# Patient Record
Sex: MALE | Race: WHITE | NOT HISPANIC OR LATINO | Employment: FULL TIME | URBAN - NONMETROPOLITAN AREA
[De-identification: names, ages, dates, MRNs, and addresses within clinical notes are randomized per-mention and may not be internally consistent; named-entity substitution may affect disease eponyms.]

---

## 2018-10-08 ENCOUNTER — OFFICE VISIT (OUTPATIENT)
Dept: FAMILY MEDICINE CLINIC | Facility: CLINIC | Age: 28
End: 2018-10-08

## 2018-10-08 VITALS
WEIGHT: 235 LBS | OXYGEN SATURATION: 98 % | SYSTOLIC BLOOD PRESSURE: 120 MMHG | DIASTOLIC BLOOD PRESSURE: 80 MMHG | HEART RATE: 92 BPM | HEIGHT: 68 IN | BODY MASS INDEX: 35.61 KG/M2 | TEMPERATURE: 99.1 F | RESPIRATION RATE: 16 BRPM

## 2018-10-08 DIAGNOSIS — N20.0 KIDNEY STONE: Primary | ICD-10-CM

## 2018-10-08 DIAGNOSIS — Z87.442 HISTORY OF KIDNEY STONES: ICD-10-CM

## 2018-10-08 PROCEDURE — 99214 OFFICE O/P EST MOD 30 MIN: CPT | Performed by: NURSE PRACTITIONER

## 2018-10-08 RX ORDER — HYDROCODONE BITARTRATE AND ACETAMINOPHEN 10; 325 MG/1; MG/1
1 TABLET ORAL 3 TIMES DAILY
Refills: 0 | COMMUNITY
Start: 2018-09-25 | End: 2018-12-28

## 2018-10-08 RX ORDER — TAMSULOSIN HYDROCHLORIDE 0.4 MG/1
CAPSULE ORAL
COMMUNITY
Start: 2018-07-23 | End: 2018-12-28

## 2018-10-08 RX ORDER — PHENAZOPYRIDINE HYDROCHLORIDE 200 MG/1
200 TABLET, FILM COATED ORAL 3 TIMES DAILY PRN
Qty: 30 TABLET | Refills: 0 | Status: CANCELLED | OUTPATIENT
Start: 2018-10-08

## 2018-10-08 RX ORDER — TRAMADOL HYDROCHLORIDE 50 MG/1
50 TABLET ORAL EVERY 6 HOURS PRN
Qty: 12 TABLET | Refills: 0 | Status: SHIPPED | OUTPATIENT
Start: 2018-10-08 | End: 2018-12-28

## 2018-10-08 NOTE — PROGRESS NOTES
Subjective   Joseph Jensen is a 28 y.o. male who presents to the office for [facility] follow-up.    History of Present Illness     Reviewed ER visit for [facility] on 9/25/18 for right sided pain.  Patient complaining of right lower quadrant abdominal pain, onset 1 hour ago.  History of kidney stones and having blood in the urine.  Crampy abdominal pain scale of 4 out of 10.  CBC showed white blood count count within normal limits.  The monocytes slightly elevated.  UA showed 4+ blood, 1+ protein, trace leukocytes, 50+ red blood cells, trace bacteria.  CMP showed glucose 122, , total protein 8.3.  Patient given Toradol 30 mg shot and normal saline that while in the ER.  Prescribed Flomax daily 0.4 mg and Lortab 10, 3 times a day, 90 count.  Diagnosed with primary impression of renal stone provided with information about with Dr. Adamson not improving.  Patient also had CT scan of abdomen and pelvis without contrast that showed a 4 mm calcification, large in right UP junction which is a new finding since previous exam.  The previously noted left ureteral vesicle junction calculus is not longer present in that site and there is no evidence of obstructive your opacity on either side.    Patient today states that he is still having pain and has not passed a kidney stone is straining his urine, has decreased urine output and feels the constant urge to urinate, some blood in urine, but not as bad as it was at ER visit.  States the pain is now in the right lower abdomen/pelvic region radiating into the right groin area, sharp and achy and persistent.  Patient is on his last Lortab dose has been cutting them in half.  Patient states history of kidney stones in 2014 and saw urologist Fly Trujillo through [place], would like to go back to [place] see Dr. Rodrigues.    The following portions of the patient's history were reviewed and updated as appropriate: allergies, current medications, past family history, past medical history,  "past social history, past surgical history and problem list.    OLIVE reviewed today.  10//18, it is negative, probably doesn't have time to upload refilled Lortab prescription from [facility].    Review of Systems   Constitutional: Positive for activity change. Negative for appetite change, chills, diaphoresis, fatigue, fever and unexpected weight change.   Respiratory: Negative.    Cardiovascular: Negative.    Gastrointestinal: Negative.    Genitourinary: Positive for decreased urine volume, difficulty urinating, dysuria, frequency, hematuria and urgency. Negative for discharge, enuresis, flank pain, genital sores, penile pain, penile swelling, scrotal swelling and testicular pain.        Right groin pain     Musculoskeletal: Negative for back pain.   Neurological: Negative.        Past Medical History:   Diagnosis Date   • Kidney stones        History reviewed. No pertinent family history.       Objective   /80   Pulse 92   Temp 99.1 °F (37.3 °C) (Temporal Artery )   Resp 16   Ht 172.7 cm (68\")   Wt 107 kg (235 lb)   SpO2 98%   BMI 35.73 kg/m²   Physical Exam   Constitutional: He is oriented to person, place, and time. He appears well-developed and well-nourished. He is cooperative. No distress.   Cardiovascular: Normal rate, regular rhythm, normal heart sounds and intact distal pulses.  Exam reveals no gallop and no friction rub.    No murmur heard.  Pulmonary/Chest: Effort normal and breath sounds normal. No respiratory distress. He has no wheezes. He has no rales.   Abdominal: Soft. Normal appearance and bowel sounds are normal. He exhibits no shifting dullness, no distension, no pulsatile liver, no fluid wave, no abdominal bruit, no ascites, no pulsatile midline mass and no mass. There is no hepatosplenomegaly. There is no tenderness. There is no rigidity, no rebound, no guarding and no CVA tenderness. No hernia.   Genitourinary:         Neurological: He is alert and oriented to person, place, and " time. He is not disoriented. Coordination and gait normal.   Skin: Skin is warm and dry.   Psychiatric: He has a normal mood and affect. His speech is normal and behavior is normal. He is not actively hallucinating. He is attentive.   Nursing note and vitals reviewed.       PHQ-2/PHQ-9 Depression Screening 10/8/2018   Little interest or pleasure in doing things 0   Feeling down, depressed, or hopeless 0   Total Score 0         Assessment/Plan   Joseph was seen today for follow-up.    Diagnoses and all orders for this visit:    Kidney stone  -     traMADol (ULTRAM) 50 MG tablet; Take 1 tablet by mouth Every 6 (Six) Hours As Needed for Moderate Pain  or Severe Pain .  -     Ambulatory Referral to Urology    History of kidney stones  -     Ambulatory Referral to Urology    Other orders  -     Cancel: phenazopyridine (PYRIDIUM) 200 MG tablet; Take 1 tablet by mouth 3 (Three) Times a Day As Needed for bladder spasms.           Patient here for [facility] follow-up for kidney stones.  Reviewed ER visit and summarized above.  Patient still has kidney stone that he has not passed yet 4 mm in size, patient almost out of Lortabs on his last pill today.  Educated patient that I cannot prescribe this, but will prescribe short-term dose of tramadol 4 times a day when necessary ×3 days to bridge Until he can see urologist.  OLIVE reviewed and negative for any recent narcotic prescription besides Lortabs recently prescribed [facility].  Short-term dose so no urine drug screen or contract needed today.  Patient educated about side effects of taking controlled substance as well as the drug itself tramadol.  Will get urgent follow-up with urologist, Dr. Rodrigues.  Educated patient to continue Flomax daily, follow-up with ER ASAP if unable to urinate, blood increases, or pain increases.  Patient states understanding.    Patient educated to follow-up sooner than next scheduled appointment if condition(s) worse or do not improve. Patient states  understanding and is in agreeance with plan of care. An After Visit Summary was printed and given to the patient.      AYAAN Bolton        This document has been electronically signed by AYAAN Bolton on October 8, 2018 1:31 PM      EMR/Transcription Dragon Disclaimer:  Some of this note may be an electronic dragon transcription/translation of spoken language to printed text. The electronic translation of spoken language may permit erroneous, or at times, nonsensical words or phrases to be inadvertently transcribed. Although I have reviewed the note for such errors, some may still exist.

## 2019-08-09 ENCOUNTER — OFFICE VISIT (OUTPATIENT)
Dept: FAMILY MEDICINE CLINIC | Facility: CLINIC | Age: 29
End: 2019-08-09

## 2019-08-09 VITALS
DIASTOLIC BLOOD PRESSURE: 80 MMHG | HEART RATE: 80 BPM | BODY MASS INDEX: 34.56 KG/M2 | SYSTOLIC BLOOD PRESSURE: 140 MMHG | HEIGHT: 68 IN | OXYGEN SATURATION: 99 % | RESPIRATION RATE: 16 BRPM | TEMPERATURE: 99 F | WEIGHT: 228 LBS

## 2019-08-09 DIAGNOSIS — Z72.0 NICOTINE VAPOR PRODUCT USER: ICD-10-CM

## 2019-08-09 DIAGNOSIS — G47.00 INSOMNIA, UNSPECIFIED TYPE: ICD-10-CM

## 2019-08-09 DIAGNOSIS — F41.9 ANXIETY: Primary | ICD-10-CM

## 2019-08-09 DIAGNOSIS — F41.0 PANIC ATTACK: ICD-10-CM

## 2019-08-09 DIAGNOSIS — Z87.891 FORMER SMOKER: ICD-10-CM

## 2019-08-09 PROCEDURE — 99214 OFFICE O/P EST MOD 30 MIN: CPT | Performed by: NURSE PRACTITIONER

## 2019-08-09 RX ORDER — BUPROPION HYDROCHLORIDE 150 MG/1
150 TABLET ORAL DAILY
Qty: 30 TABLET | Refills: 5 | Status: SHIPPED | OUTPATIENT
Start: 2019-08-09 | End: 2019-08-16 | Stop reason: SDUPTHER

## 2019-08-09 RX ORDER — BUSPIRONE HYDROCHLORIDE 5 MG/1
5 TABLET ORAL 3 TIMES DAILY PRN
Qty: 90 TABLET | Refills: 0 | Status: SHIPPED | OUTPATIENT
Start: 2019-08-09 | End: 2019-08-16 | Stop reason: SDUPTHER

## 2019-08-09 RX ORDER — TRAZODONE HYDROCHLORIDE 50 MG/1
50 TABLET ORAL NIGHTLY
Qty: 30 TABLET | Refills: 0 | Status: SHIPPED | OUTPATIENT
Start: 2019-08-09 | End: 2019-08-16

## 2019-08-13 ENCOUNTER — OFFICE VISIT (OUTPATIENT)
Dept: FAMILY MEDICINE CLINIC | Facility: CLINIC | Age: 29
End: 2019-08-13

## 2019-08-13 VITALS
DIASTOLIC BLOOD PRESSURE: 80 MMHG | RESPIRATION RATE: 16 BRPM | OXYGEN SATURATION: 99 % | HEART RATE: 87 BPM | HEIGHT: 68 IN | WEIGHT: 229 LBS | SYSTOLIC BLOOD PRESSURE: 124 MMHG | TEMPERATURE: 98 F | BODY MASS INDEX: 34.71 KG/M2

## 2019-08-13 DIAGNOSIS — F41.0 PANIC ATTACK: ICD-10-CM

## 2019-08-13 DIAGNOSIS — F41.9 ANXIETY: Primary | ICD-10-CM

## 2019-08-13 DIAGNOSIS — G47.00 INSOMNIA, UNSPECIFIED TYPE: ICD-10-CM

## 2019-08-13 PROCEDURE — 99214 OFFICE O/P EST MOD 30 MIN: CPT | Performed by: NURSE PRACTITIONER

## 2019-08-13 RX ORDER — QUETIAPINE FUMARATE 50 MG/1
50 TABLET, FILM COATED ORAL NIGHTLY
Qty: 30 TABLET | Refills: 0 | Status: SHIPPED | OUTPATIENT
Start: 2019-08-13 | End: 2019-08-16 | Stop reason: SDUPTHER

## 2019-08-16 ENCOUNTER — TELEPHONE (OUTPATIENT)
Dept: FAMILY MEDICINE CLINIC | Facility: CLINIC | Age: 29
End: 2019-08-16

## 2019-08-16 DIAGNOSIS — F41.0 PANIC ATTACK: ICD-10-CM

## 2019-08-16 DIAGNOSIS — F41.9 ANXIETY: ICD-10-CM

## 2019-08-16 RX ORDER — BUPROPION HYDROCHLORIDE 300 MG/1
300 TABLET ORAL DAILY
Qty: 30 TABLET | Refills: 0 | Status: SHIPPED | OUTPATIENT
Start: 2019-08-16 | End: 2019-09-10 | Stop reason: SDUPTHER

## 2019-08-16 RX ORDER — QUETIAPINE FUMARATE 50 MG/1
50 TABLET, FILM COATED ORAL NIGHTLY
Qty: 30 TABLET | Refills: 0 | Status: SHIPPED | OUTPATIENT
Start: 2019-08-16 | End: 2019-09-10 | Stop reason: SDUPTHER

## 2019-08-16 RX ORDER — BUSPIRONE HYDROCHLORIDE 5 MG/1
5 TABLET ORAL 3 TIMES DAILY PRN
Qty: 90 TABLET | Refills: 0 | Status: SHIPPED | OUTPATIENT
Start: 2019-08-16 | End: 2019-09-10 | Stop reason: SDUPTHER

## 2019-08-16 NOTE — TELEPHONE ENCOUNTER
He says he is doing great on one Seroquel nightly and 1 wellbutrin bid. He said on the Buspar he has only been taking 1 daily because the Wellbutrin has been working well. He is okay with increasing the dosage of Wellbutrin to the 300mg XL daily. He would at least like you to give him extra Buspar incase the once daily of Wellbutrin doesn't hold him over through out the day.

## 2019-08-28 PROBLEM — G47.00 INSOMNIA: Status: ACTIVE | Noted: 2019-08-28

## 2019-08-28 PROBLEM — Z72.0 NICOTINE VAPOR PRODUCT USER: Status: ACTIVE | Noted: 2019-08-28

## 2019-08-28 PROBLEM — F41.9 ANXIETY: Status: ACTIVE | Noted: 2019-08-28

## 2019-08-28 PROBLEM — F41.0 PANIC ATTACK: Status: ACTIVE | Noted: 2019-08-28

## 2019-08-28 PROBLEM — Z87.891 FORMER SMOKER: Status: ACTIVE | Noted: 2019-08-28

## 2019-08-28 NOTE — PROGRESS NOTES
Subjective   Joseph Jensen is a 29 y.o. male who presents to the office to establish care.    History of Present Illness     Anxiety/panic attacks/insomnia--chronic but worsening.  -Patient states he has a little bit a history of anxiety and depression, in 2016 his grandma passed away, he [facility] in [place] and was on some type of medication but does not remember the name of it but states that it did work for a while.  Anxiety and depression resolved for quite some time.  -8/9/2019: Patient states severe anxiety that started about 2 weeks ago, states unsure of anything that could have brought this on.  But he is having anxiety about everything and panic attacks as well.  Patient states he is never of lumbar himself before and he has flying from here to [place] next Sunday 8/18 and is having a lot of anxiety over it however the anxiety started before this.  Patient states that he is barely sleeping having trouble falling asleep and not staying asleep.  States that he is getting sick to his stomach and having headaches states it is progressively getting worse.  States that he is having panic attacks 3-4 times a day they can last a few seconds to 10 seconds but he gets sick to his stomach his respirations increased he has palpitations and he shakes.  -Plan of care: We will start patient on Wellbutrin 150 mg daily and BuSpar 5 mg 3 times daily as needed and trazodone 50 mg at bedtime, patient will follow-up with me in 1 week as this seems an acute onset none concerned about him, patient educated to call sooner if he has any problems.    Former smoker, current vapor- patient states that he smoked from 2013 until March 2019 about 2 packs/week that he stopped and switched to vaping in March 2019.  Does not want to stop vaping at this time.    Follow-up visit in.  Genetic testing at follow-up visit    Past medical history:  Kidney stones  2015- lingual nerve entrapment per patient    Surgical history:  Kidney stone  "removal 2014  wisdom teeth removal    Family history: Grandparents-diabetes, arthritis    The following portions of the patient's history were reviewed and updated as appropriate: allergies, current medications, past family history, past medical history, past social history, past surgical history and problem list.    Olive reviewed on 8/9/2019: Patient has 2 short-term prescriptions of tramadol, prescribed by myself 50 mg, 12 count on 10/8/2018 and by [facility] urologist on 10/11/2018, 50 mg, 30 count.  Short-term dose by Dr. Guillen of Lortab 10, 9 count on 9/5/2018.  Otherwise negative OLIVE.    Review of Systems   Constitutional: Negative for activity change, appetite change, chills, diaphoresis, fatigue, fever and unexpected weight change.   HENT: Negative for congestion, ear discharge, ear pain, hearing loss, postnasal drip, rhinorrhea, sinus pressure, sinus pain, sneezing, sore throat, tinnitus and trouble swallowing.    Eyes: Negative.    Respiratory: Negative for cough, chest tightness, shortness of breath and wheezing.    Cardiovascular: Negative for chest pain, palpitations and leg swelling.   Gastrointestinal: Negative for abdominal distention, abdominal pain, blood in stool, constipation, diarrhea, nausea and vomiting.   Genitourinary: Negative for decreased urine volume, discharge, dysuria, flank pain, frequency, hematuria, penile pain, penile swelling, scrotal swelling, testicular pain and urgency.   Neurological: Negative for dizziness, tremors, seizures, syncope, weakness, light-headedness, numbness and headaches.   Hematological: Does not bruise/bleed easily.   Psychiatric/Behavioral: Positive for dysphoric mood and sleep disturbance. Negative for agitation, behavioral problems, decreased concentration, self-injury and suicidal ideas. The patient is nervous/anxious.          Objective   /80   Pulse 80   Temp 99 °F (37.2 °C) (Oral)   Resp 16   Ht 172.7 cm (68\")   Wt 103 kg (228 lb)   " SpO2 99%   BMI 34.67 kg/m²   Physical Exam   Constitutional: He is oriented to person, place, and time. He appears well-developed and well-nourished. He is cooperative. No distress.   Cardiovascular: Normal rate, regular rhythm, normal heart sounds and intact distal pulses. Exam reveals no gallop and no friction rub.   No murmur heard.  Pulmonary/Chest: Effort normal and breath sounds normal. No respiratory distress. He has no wheezes. He has no rales.   Abdominal: Soft. Normal appearance and bowel sounds are normal. He exhibits no shifting dullness, no distension, no pulsatile liver, no fluid wave, no abdominal bruit, no ascites, no pulsatile midline mass and no mass. There is no hepatosplenomegaly. There is no tenderness. There is no rigidity, no rebound, no guarding and no CVA tenderness. No hernia.   Neurological: He is alert and oriented to person, place, and time. He is not disoriented. Coordination and gait normal.   Skin: Skin is warm and dry.   Psychiatric: His speech is normal and behavior is normal. Thought content normal. His mood appears anxious. His affect is not angry, not blunt, not labile and not inappropriate. He is not actively hallucinating. Cognition and memory are normal. He does not exhibit a depressed mood.   Patient is dressed appropriately for weather and situation, makes eye contact, and engages in conversation.  He is attentive.   Nursing note and vitals reviewed.       PHQ-2/PHQ-9 Depression Screening 10/8/2018   Little interest or pleasure in doing things 0   Feeling down, depressed, or hopeless 0   Total Score 0         Assessment/Plan   Joseph was seen today for establish care.    Diagnoses and all orders for this visit:    Anxiety  -     Discontinue: buPROPion XL (WELLBUTRIN XL) 150 MG 24 hr tablet; Take 1 tablet by mouth Daily.  -     Discontinue: busPIRone (BUSPAR) 5 MG tablet; Take 1 tablet by mouth 3 (Three) Times a Day As Needed (anxiety).    Panic attack  -     Discontinue:  buPROPion XL (WELLBUTRIN XL) 150 MG 24 hr tablet; Take 1 tablet by mouth Daily.  -     Discontinue: busPIRone (BUSPAR) 5 MG tablet; Take 1 tablet by mouth 3 (Three) Times a Day As Needed (anxiety).    Insomnia, unspecified type  -     Discontinue: traZODone (DESYREL) 50 MG tablet; Take 1 tablet by mouth Every Night.    Former smoker    Nicotine vapor product user           Anxiety/panic attacks/insomnia--chronic but worsening.  -Plan of care: We will start patient on Wellbutrin 150 mg daily and BuSpar 5 mg 3 times daily as needed and trazodone 50 mg at bedtime, patient will follow-up with me in 1 week as this seems an acute onset none concerned about him, patient educated to call sooner if he has any problems.    Former smoker, current vapor- patient states that he smoked from 2013 until March 2019 about 2 packs/week that he stopped and switched to vaping in March 2019.  Does not want to stop vaping at this time.  -Plan of care: Continue to monitor    Follow-up visit in.  Genetic testing at follow-up visit    Patient educated to follow-up sooner than next scheduled appointment if condition(s) worse or do not improve. Patient states understanding and is in agreeance with plan of care. An After Visit Summary was printed and given to the patient.      AYAAN Bolton        This document has been electronically signed by AYAAN Bolton on August 28, 2019 10:51 AM      EMR/Transcription Dragon Disclaimer:  Some of this note may be an electronic dragon transcription/translation of spoken language to printed text. The electronic translation of spoken language may permit erroneous, or at times, nonsensical words or phrases to be inadvertently transcribed. Although I have reviewed the note for such errors, some may still exist.

## 2019-09-02 NOTE — PROGRESS NOTES
Subjective   Joseph Jensen is a 29 y.o. male who presents to the office for follow-up on anxiety.  History of Present Illness     Reminder: Do GENETIC testing at next visit.    Anxiety/panic attacks/insomnia--chronic, improving somewhat but not controlled with on Wellbutrin 150 mg daily, BuSpar 5 mg 3 times daily as needed, trazodone 100 mg at bedtime.  Insomnia not improving at all.  -Patient states he has a little bit a history of anxiety and depression, in 2016 his grandma passed away, he saw [facility] in [place] and was on some type of medication but does not remember the name of it but states that it did work for a while.  Anxiety and depression resolved for quite some time.  -8/9/2019: Patient states severe anxiety that started about 2 weeks ago, states unsure of anything that could have brought this on.  But he is having anxiety about everything and panic attacks as well.  Patient states he is never of lumbar himself before and he has flying from here to [place] next Sunday 8/18 and is having a lot of anxiety over it however the anxiety started before this.  Patient states that he is barely sleeping having trouble falling asleep and not staying asleep.  States that he is getting sick to his stomach and having headaches states it is progressively getting worse.  States that he is having panic attacks 3-4 times a day they can last a few seconds to 10 seconds but he gets sick to his stomach his respirations increased he has palpitations and he shakes.  Plan of care was to try Wellbutrin BuSpar and trazodone.  -8/13/2019: Patient states that his panic attacks have decreased it takes about 30 minutes for the medications to work at 3 PM is wearing off and he is feeling anxious, have not tried a BuSpar at this time.  States he is having no appetite but still not sleeping if he does sleep its from about 11 PM to 4 AM.  Has tried trazodone at 100 mg.  -Plan of care: Titrate up and try trazodone at 200 mg at  bedtime, if this does not work try Seroquel at 50 mg and go up to 100 mg.  Continue BuSpar 3 times daily.  Consider doubling Wellbutrin.  Try one medication change at a time.  Call me on Friday and let me know how you are doing and I will refill times a month and then follow-up with you.    Past medical history:  Former smoker, current vapor- patient states that he smoked from 2013 until March 2019 about 2 packs/week that he stopped and switched to vaping in March 2019.  Does not want to stop vaping at this time.  Kidney stones  2015- lingual nerve entrapment per patient    Surgical history:  Kidney stone removal 2014  wisdom teeth removal    Family history: Grandparents-diabetes, arthritis    The following portions of the patient's history were reviewed and updated as appropriate: allergies, current medications, past family history, past medical history, past social history, past surgical history and problem list.    Olive reviewed on 8/9/2019: Patient has 2 short-term prescriptions of tramadol, prescribed by myself 50 mg, 12 count on 10/8/2018 and by [facility] urologist on 10/11/2018, 50 mg, 30 count.  Short-term dose by Dr. Guillen of Lortab 10, 9 count on 9/5/2018.  Otherwise negative OLIVE.    Review of Systems   Constitutional: Negative for activity change, appetite change, chills, diaphoresis, fatigue, fever and unexpected weight change.   HENT: Negative for congestion, ear discharge, ear pain, hearing loss, postnasal drip, rhinorrhea, sinus pressure, sinus pain, sneezing, sore throat, tinnitus and trouble swallowing.    Eyes: Negative.    Respiratory: Negative for cough, chest tightness, shortness of breath and wheezing.    Cardiovascular: Negative for chest pain, palpitations and leg swelling.   Gastrointestinal: Negative for abdominal distention, abdominal pain, blood in stool, constipation, diarrhea, nausea and vomiting.   Genitourinary: Negative for decreased urine volume, discharge, dysuria, flank  "pain, frequency, hematuria, penile pain, penile swelling, scrotal swelling, testicular pain and urgency.   Neurological: Negative for dizziness, tremors, seizures, syncope, weakness, light-headedness, numbness and headaches.   Hematological: Does not bruise/bleed easily.   Psychiatric/Behavioral: Positive for dysphoric mood and sleep disturbance. Negative for agitation, behavioral problems, decreased concentration, self-injury and suicidal ideas. The patient is nervous/anxious.          Objective   /80   Pulse 87   Temp 98 °F (36.7 °C) (Oral)   Resp 16   Ht 172.7 cm (68\")   Wt 104 kg (229 lb)   SpO2 99%   BMI 34.82 kg/m²   Physical Exam   Constitutional: He is oriented to person, place, and time. He appears well-developed and well-nourished. He is cooperative. No distress.   Cardiovascular: Normal rate, regular rhythm, normal heart sounds and intact distal pulses. Exam reveals no gallop and no friction rub.   No murmur heard.  Pulmonary/Chest: Effort normal and breath sounds normal. No respiratory distress. He has no wheezes. He has no rales.   Abdominal: Soft. Normal appearance and bowel sounds are normal. He exhibits no shifting dullness, no distension, no pulsatile liver, no fluid wave, no abdominal bruit, no ascites, no pulsatile midline mass and no mass. There is no hepatosplenomegaly. There is no tenderness. There is no rigidity, no rebound, no guarding and no CVA tenderness. No hernia.   Neurological: He is alert and oriented to person, place, and time. He is not disoriented. Coordination and gait normal.   Skin: Skin is warm and dry.   Psychiatric: His speech is normal and behavior is normal. Thought content normal. His mood appears anxious. His affect is not angry, not blunt, not labile and not inappropriate. He is not actively hallucinating. Cognition and memory are normal. He does not exhibit a depressed mood.   Patient is dressed appropriately for weather and situation, makes eye contact, " and engages in conversation.  He is attentive.   Nursing note and vitals reviewed.       PHQ-2/PHQ-9 Depression Screening 10/8/2018   Little interest or pleasure in doing things 0   Feeling down, depressed, or hopeless 0   Total Score 0         Assessment/Plan   Joseph was seen today for follow-up.    Diagnoses and all orders for this visit:    Anxiety    Panic attack    Insomnia, unspecified type    Other orders  -     Discontinue: QUEtiapine (SEROquel) 50 MG tablet; Take 1 tablet by mouth Every Night.           Anxiety/panic attacks/insomnia--chronic, improving somewhat but not controlled with on Wellbutrin 150 mg daily, BuSpar 5 mg 3 times daily as needed, trazodone 100 mg at bedtime.  Insomnia not improving at all.  -Patient states he has a little bit a history of anxiety and depression, in 2016 his grandma passed away, he [facility] in [place] and was on some type of medication but does not remember the name of it but states that it did work for a while.  Anxiety and depression resolved for quite some time.  -Plan of care: Titrate up and try trazodone at 200 mg at bedtime, if this does not work try Seroquel at 50 mg and go up to 100 mg.  Continue BuSpar 3 times daily.  Consider doubling Wellbutrin.  Try one medication change at a time.  Call me on Friday and let me know how you are doing and I will refill times a month and then follow-up with you.    Follow-up visit in 1 month.  Genetic testing at follow-up visit    Patient educated to follow-up sooner than next scheduled appointment if condition(s) worse or do not improve. Patient states understanding and is in agreeance with plan of care. An After Visit Summary was printed and given to the patient.      AYAAN Bolton        This document has been electronically signed by AYAAN Bolton on September 2, 2019 4:13 PM      EMR/Transcription Dragon Disclaimer:  Some of this note may be an electronic dragon transcription/translation of spoken  language to printed text. The electronic translation of spoken language may permit erroneous, or at times, nonsensical words or phrases to be inadvertently transcribed. Although I have reviewed the note for such errors, some may still exist.

## 2019-09-10 ENCOUNTER — OFFICE VISIT (OUTPATIENT)
Dept: FAMILY MEDICINE CLINIC | Facility: CLINIC | Age: 29
End: 2019-09-10

## 2019-09-10 VITALS
DIASTOLIC BLOOD PRESSURE: 90 MMHG | BODY MASS INDEX: 35.31 KG/M2 | HEIGHT: 68 IN | RESPIRATION RATE: 16 BRPM | OXYGEN SATURATION: 99 % | HEART RATE: 99 BPM | TEMPERATURE: 97.8 F | WEIGHT: 233 LBS | SYSTOLIC BLOOD PRESSURE: 130 MMHG

## 2019-09-10 DIAGNOSIS — F41.0 PANIC ATTACK: ICD-10-CM

## 2019-09-10 DIAGNOSIS — F41.9 ANXIETY: ICD-10-CM

## 2019-09-10 DIAGNOSIS — G47.00 INSOMNIA, UNSPECIFIED TYPE: Primary | ICD-10-CM

## 2019-09-10 PROCEDURE — 99213 OFFICE O/P EST LOW 20 MIN: CPT | Performed by: NURSE PRACTITIONER

## 2019-09-10 RX ORDER — BUPROPION HYDROCHLORIDE 300 MG/1
300 TABLET ORAL DAILY
Qty: 30 TABLET | Refills: 5 | Status: SHIPPED | OUTPATIENT
Start: 2019-09-10 | End: 2020-01-17 | Stop reason: SDUPTHER

## 2019-09-10 RX ORDER — BUSPIRONE HYDROCHLORIDE 5 MG/1
5 TABLET ORAL 3 TIMES DAILY PRN
Qty: 90 TABLET | Refills: 5 | Status: SHIPPED | OUTPATIENT
Start: 2019-09-10 | End: 2020-01-17 | Stop reason: SDUPTHER

## 2019-09-10 RX ORDER — QUETIAPINE FUMARATE 50 MG/1
50 TABLET, FILM COATED ORAL NIGHTLY
Qty: 30 TABLET | Refills: 5 | Status: SHIPPED | OUTPATIENT
Start: 2019-09-10 | End: 2020-01-17 | Stop reason: SDUPTHER

## 2020-01-17 ENCOUNTER — OFFICE VISIT (OUTPATIENT)
Dept: FAMILY MEDICINE CLINIC | Facility: CLINIC | Age: 30
End: 2020-01-17

## 2020-01-17 ENCOUNTER — LAB (OUTPATIENT)
Dept: LAB | Facility: OTHER | Age: 30
End: 2020-01-17

## 2020-01-17 VITALS
DIASTOLIC BLOOD PRESSURE: 80 MMHG | WEIGHT: 235 LBS | OXYGEN SATURATION: 99 % | HEART RATE: 69 BPM | RESPIRATION RATE: 16 BRPM | HEIGHT: 68 IN | SYSTOLIC BLOOD PRESSURE: 122 MMHG | BODY MASS INDEX: 35.61 KG/M2 | TEMPERATURE: 98.1 F

## 2020-01-17 DIAGNOSIS — M62.838 MUSCLE SPASM: ICD-10-CM

## 2020-01-17 DIAGNOSIS — Z83.79 FAMILY HISTORY OF LIVER DISEASE: ICD-10-CM

## 2020-01-17 DIAGNOSIS — F41.9 ANXIETY: ICD-10-CM

## 2020-01-17 DIAGNOSIS — N20.0 KIDNEY STONES: ICD-10-CM

## 2020-01-17 DIAGNOSIS — R10.84 GENERALIZED ABDOMINAL PAIN: ICD-10-CM

## 2020-01-17 DIAGNOSIS — R11.2 NAUSEA AND VOMITING, INTRACTABILITY OF VOMITING NOT SPECIFIED, UNSPECIFIED VOMITING TYPE: ICD-10-CM

## 2020-01-17 DIAGNOSIS — G47.00 INSOMNIA, UNSPECIFIED TYPE: ICD-10-CM

## 2020-01-17 DIAGNOSIS — G56.01 RIGHT CARPAL TUNNEL SYNDROME: Primary | ICD-10-CM

## 2020-01-17 DIAGNOSIS — F41.0 PANIC ATTACK: ICD-10-CM

## 2020-01-17 LAB
ALBUMIN SERPL-MCNC: 4.9 G/DL (ref 3.5–5)
ALBUMIN/GLOB SERPL: 1.3 G/DL (ref 1.1–1.8)
ALP SERPL-CCNC: 64 U/L (ref 38–126)
ALT SERPL W P-5'-P-CCNC: 96 U/L
ANION GAP SERPL CALCULATED.3IONS-SCNC: 10 MMOL/L (ref 5–15)
AST SERPL-CCNC: 44 U/L (ref 17–59)
BILIRUB SERPL-MCNC: 0.5 MG/DL (ref 0.2–1.3)
BUN BLD-MCNC: 13 MG/DL (ref 7–23)
BUN/CREAT SERPL: 12.9 (ref 7–25)
CALCIUM SPEC-SCNC: 10.1 MG/DL (ref 8.4–10.2)
CHLORIDE SERPL-SCNC: 102 MMOL/L (ref 101–112)
CO2 SERPL-SCNC: 30 MMOL/L (ref 22–30)
CREAT BLD-MCNC: 1.01 MG/DL (ref 0.7–1.3)
GFR SERPL CREATININE-BSD FRML MDRD: 88 ML/MIN/1.73 (ref 77–179)
GLOBULIN UR ELPH-MCNC: 3.8 GM/DL (ref 2.3–3.5)
GLUCOSE BLD-MCNC: 97 MG/DL (ref 70–99)
MAGNESIUM SERPL-MCNC: 2.1 MG/DL (ref 1.6–2.3)
POTASSIUM BLD-SCNC: 4 MMOL/L (ref 3.4–5)
PROT SERPL-MCNC: 8.7 G/DL (ref 6.3–8.6)
SODIUM BLD-SCNC: 142 MMOL/L (ref 137–145)

## 2020-01-17 PROCEDURE — 99214 OFFICE O/P EST MOD 30 MIN: CPT | Performed by: NURSE PRACTITIONER

## 2020-01-17 PROCEDURE — 80053 COMPREHEN METABOLIC PANEL: CPT | Performed by: NURSE PRACTITIONER

## 2020-01-17 PROCEDURE — 83735 ASSAY OF MAGNESIUM: CPT | Performed by: NURSE PRACTITIONER

## 2020-01-17 RX ORDER — PHENAZOPYRIDINE HYDROCHLORIDE 100 MG/1
100 TABLET, FILM COATED ORAL 3 TIMES DAILY PRN
Qty: 30 TABLET | Refills: 0 | Status: SHIPPED | OUTPATIENT
Start: 2020-01-17 | End: 2020-05-15

## 2020-01-17 RX ORDER — BUSPIRONE HYDROCHLORIDE 5 MG/1
5 TABLET ORAL 3 TIMES DAILY PRN
Qty: 90 TABLET | Refills: 5 | Status: SHIPPED | OUTPATIENT
Start: 2020-01-17 | End: 2020-05-15 | Stop reason: SDUPTHER

## 2020-01-17 RX ORDER — QUETIAPINE FUMARATE 50 MG/1
50 TABLET, FILM COATED ORAL NIGHTLY
Qty: 30 TABLET | Refills: 5 | Status: SHIPPED | OUTPATIENT
Start: 2020-01-17 | End: 2020-05-15 | Stop reason: SDUPTHER

## 2020-01-17 RX ORDER — BUPROPION HYDROCHLORIDE 300 MG/1
300 TABLET ORAL DAILY
Qty: 30 TABLET | Refills: 5 | Status: SHIPPED | OUTPATIENT
Start: 2020-01-17 | End: 2020-05-15 | Stop reason: SDUPTHER

## 2020-02-18 DIAGNOSIS — K76.0 FATTY LIVER: ICD-10-CM

## 2020-02-18 DIAGNOSIS — R74.8 ELEVATED LIVER ENZYMES: ICD-10-CM

## 2020-02-18 DIAGNOSIS — Z83.79 FAMILY HISTORY OF LIVER DISEASE: Primary | ICD-10-CM

## 2020-02-19 PROBLEM — Z83.79 FAMILY HISTORY OF CIRRHOSIS OF LIVER: Status: ACTIVE | Noted: 2020-02-19

## 2020-02-19 PROBLEM — R74.01 ELEVATED ALT MEASUREMENT: Status: ACTIVE | Noted: 2020-02-19

## 2020-02-19 PROBLEM — K76.0 FATTY LIVER: Status: ACTIVE | Noted: 2020-02-19

## 2020-02-19 PROBLEM — Z87.442 HISTORY OF KIDNEY STONES: Status: ACTIVE | Noted: 2020-02-19

## 2020-02-20 ENCOUNTER — OFFICE VISIT (OUTPATIENT)
Dept: FAMILY MEDICINE CLINIC | Facility: CLINIC | Age: 30
End: 2020-02-20

## 2020-02-20 VITALS
HEIGHT: 68 IN | HEART RATE: 84 BPM | BODY MASS INDEX: 36.53 KG/M2 | SYSTOLIC BLOOD PRESSURE: 100 MMHG | TEMPERATURE: 97.2 F | RESPIRATION RATE: 16 BRPM | WEIGHT: 241 LBS | OXYGEN SATURATION: 99 % | DIASTOLIC BLOOD PRESSURE: 80 MMHG

## 2020-02-20 DIAGNOSIS — Z23 NEED FOR IMMUNIZATION AGAINST TYPHOID: ICD-10-CM

## 2020-02-20 DIAGNOSIS — F41.9 ANXIETY: ICD-10-CM

## 2020-02-20 DIAGNOSIS — Z71.84 TRAVEL ADVICE ENCOUNTER: Primary | ICD-10-CM

## 2020-02-20 DIAGNOSIS — Z87.442 HISTORY OF KIDNEY STONES: ICD-10-CM

## 2020-02-20 DIAGNOSIS — K76.0 FATTY LIVER: ICD-10-CM

## 2020-02-20 DIAGNOSIS — F41.0 PANIC ATTACK: ICD-10-CM

## 2020-02-20 DIAGNOSIS — Z83.79 FAMILY HISTORY OF CIRRHOSIS OF LIVER: ICD-10-CM

## 2020-02-20 DIAGNOSIS — R74.01 ELEVATED ALT MEASUREMENT: ICD-10-CM

## 2020-02-20 DIAGNOSIS — G47.00 INSOMNIA, UNSPECIFIED TYPE: ICD-10-CM

## 2020-02-20 PROBLEM — B34.9 VIRAL INFECTION: Status: ACTIVE | Noted: 2020-02-20

## 2020-02-20 PROBLEM — N20.1 URETEROLITHIASIS: Status: ACTIVE | Noted: 2020-02-20

## 2020-02-20 PROBLEM — N30.90 CYSTITIS: Status: ACTIVE | Noted: 2020-02-20

## 2020-02-20 PROBLEM — R51.9 HEADACHE: Status: ACTIVE | Noted: 2020-02-20

## 2020-02-20 PROCEDURE — 90471 IMMUNIZATION ADMIN: CPT | Performed by: NURSE PRACTITIONER

## 2020-02-20 PROCEDURE — 90632 HEPA VACCINE ADULT IM: CPT | Performed by: NURSE PRACTITIONER

## 2020-02-20 PROCEDURE — 99214 OFFICE O/P EST MOD 30 MIN: CPT | Performed by: NURSE PRACTITIONER

## 2020-02-20 RX ORDER — AZITHROMYCIN 500 MG/1
500 TABLET, FILM COATED ORAL DAILY
Qty: 7 TABLET | Refills: 0 | Status: SHIPPED | OUTPATIENT
Start: 2020-02-20 | End: 2020-05-15

## 2020-05-15 ENCOUNTER — OFFICE VISIT (OUTPATIENT)
Dept: FAMILY MEDICINE CLINIC | Facility: CLINIC | Age: 30
End: 2020-05-15

## 2020-05-15 DIAGNOSIS — F41.9 ANXIETY: ICD-10-CM

## 2020-05-15 DIAGNOSIS — G44.219 EPISODIC TENSION-TYPE HEADACHE, NOT INTRACTABLE: ICD-10-CM

## 2020-05-15 DIAGNOSIS — G47.00 INSOMNIA, UNSPECIFIED TYPE: ICD-10-CM

## 2020-05-15 DIAGNOSIS — F41.0 PANIC ATTACK: ICD-10-CM

## 2020-05-15 DIAGNOSIS — G56.01 RIGHT CARPAL TUNNEL SYNDROME: ICD-10-CM

## 2020-05-15 DIAGNOSIS — M65.4 DE QUERVAIN'S TENOSYNOVITIS, RIGHT: Primary | ICD-10-CM

## 2020-05-15 PROCEDURE — 99442 PR PHYS/QHP TELEPHONE EVALUATION 11-20 MIN: CPT | Performed by: NURSE PRACTITIONER

## 2020-05-15 RX ORDER — BUSPIRONE HYDROCHLORIDE 5 MG/1
5 TABLET ORAL 3 TIMES DAILY PRN
Qty: 90 TABLET | Refills: 5 | Status: SHIPPED | OUTPATIENT
Start: 2020-05-15 | End: 2021-11-02 | Stop reason: SDUPTHER

## 2020-05-15 RX ORDER — QUETIAPINE FUMARATE 50 MG/1
50 TABLET, FILM COATED ORAL NIGHTLY
Qty: 30 TABLET | Refills: 5 | Status: SHIPPED | OUTPATIENT
Start: 2020-05-15 | End: 2021-11-02

## 2020-05-15 RX ORDER — BUPROPION HYDROCHLORIDE 300 MG/1
300 TABLET ORAL DAILY
Qty: 30 TABLET | Refills: 5 | Status: SHIPPED | OUTPATIENT
Start: 2020-05-15 | End: 2021-11-02 | Stop reason: SDUPTHER

## 2020-05-15 RX ORDER — KETOROLAC TROMETHAMINE 10 MG/1
10 TABLET, FILM COATED ORAL 2 TIMES DAILY
Qty: 10 TABLET | Refills: 0 | Status: SHIPPED | OUTPATIENT
Start: 2020-05-15 | End: 2020-05-20

## 2020-06-08 PROBLEM — G56.01 RIGHT CARPAL TUNNEL SYNDROME: Status: ACTIVE | Noted: 2020-06-08

## 2020-06-08 PROBLEM — M65.4 DE QUERVAIN'S TENOSYNOVITIS, RIGHT: Status: ACTIVE | Noted: 2020-06-08

## 2020-10-27 ENCOUNTER — OFFICE VISIT (OUTPATIENT)
Dept: FAMILY MEDICINE CLINIC | Facility: CLINIC | Age: 30
End: 2020-10-27

## 2020-10-27 VITALS
HEIGHT: 60 IN | BODY MASS INDEX: 47.36 KG/M2 | OXYGEN SATURATION: 99 % | SYSTOLIC BLOOD PRESSURE: 134 MMHG | HEART RATE: 88 BPM | DIASTOLIC BLOOD PRESSURE: 82 MMHG | RESPIRATION RATE: 16 BRPM | WEIGHT: 241.2 LBS

## 2020-10-27 DIAGNOSIS — R21 RASH: ICD-10-CM

## 2020-10-27 DIAGNOSIS — B02.9 HERPES ZOSTER WITHOUT COMPLICATION: Primary | ICD-10-CM

## 2020-10-27 DIAGNOSIS — M79.601 RIGHT ARM PAIN: ICD-10-CM

## 2020-10-27 PROCEDURE — 99214 OFFICE O/P EST MOD 30 MIN: CPT | Performed by: NURSE PRACTITIONER

## 2020-10-27 RX ORDER — GABAPENTIN 300 MG/1
300 CAPSULE ORAL 3 TIMES DAILY PRN
Qty: 30 CAPSULE | Refills: 0 | Status: SHIPPED | OUTPATIENT
Start: 2020-10-27 | End: 2021-11-02

## 2020-10-27 RX ORDER — ACYCLOVIR 400 MG/1
400 TABLET ORAL
Qty: 50 TABLET | Refills: 0 | Status: SHIPPED | OUTPATIENT
Start: 2020-10-27 | End: 2020-11-06

## 2020-10-27 RX ORDER — PREDNISONE 1 MG/1
5 TABLET ORAL DAILY
Qty: 5 TABLET | Refills: 0 | Status: SHIPPED | OUTPATIENT
Start: 2020-10-27 | End: 2021-11-02

## 2021-01-18 ENCOUNTER — OFFICE VISIT (OUTPATIENT)
Dept: FAMILY MEDICINE CLINIC | Facility: CLINIC | Age: 31
End: 2021-01-18

## 2021-01-18 DIAGNOSIS — M54.50 LUMBAR PAIN: ICD-10-CM

## 2021-01-18 DIAGNOSIS — M54.6 ACUTE BILATERAL THORACIC BACK PAIN: Primary | ICD-10-CM

## 2021-01-18 PROCEDURE — 99443 PR PHYS/QHP TELEPHONE EVALUATION 21-30 MIN: CPT | Performed by: NURSE PRACTITIONER

## 2021-01-18 RX ORDER — BACLOFEN 10 MG/1
10 TABLET ORAL 3 TIMES DAILY PRN
Qty: 90 TABLET | Refills: 0 | Status: SHIPPED | OUTPATIENT
Start: 2021-01-18

## 2021-01-18 RX ORDER — KETOROLAC TROMETHAMINE 10 MG/1
10 TABLET, FILM COATED ORAL EVERY 6 HOURS PRN
Qty: 15 TABLET | Refills: 0 | Status: SHIPPED | OUTPATIENT
Start: 2021-01-18 | End: 2021-04-27 | Stop reason: SDUPTHER

## 2021-01-18 RX ORDER — PREDNISONE 1 MG/1
TABLET ORAL
Qty: 18 TABLET | Refills: 0 | Status: SHIPPED | OUTPATIENT
Start: 2021-01-18 | End: 2021-11-02

## 2021-04-27 DIAGNOSIS — M54.6 ACUTE BILATERAL THORACIC BACK PAIN: ICD-10-CM

## 2021-04-27 DIAGNOSIS — M54.50 LUMBAR PAIN: ICD-10-CM

## 2021-04-27 RX ORDER — KETOROLAC TROMETHAMINE 10 MG/1
10 TABLET, FILM COATED ORAL EVERY 6 HOURS PRN
Qty: 15 TABLET | Refills: 0 | Status: SHIPPED | OUTPATIENT
Start: 2021-04-27 | End: 2021-11-02

## 2021-11-02 ENCOUNTER — OFFICE VISIT (OUTPATIENT)
Dept: FAMILY MEDICINE CLINIC | Facility: CLINIC | Age: 31
End: 2021-11-02

## 2021-11-02 VITALS
HEART RATE: 92 BPM | WEIGHT: 244.8 LBS | BODY MASS INDEX: 37.1 KG/M2 | DIASTOLIC BLOOD PRESSURE: 90 MMHG | HEIGHT: 68 IN | OXYGEN SATURATION: 98 % | SYSTOLIC BLOOD PRESSURE: 124 MMHG

## 2021-11-02 DIAGNOSIS — K76.0 FATTY LIVER: ICD-10-CM

## 2021-11-02 DIAGNOSIS — F32.A ANXIETY AND DEPRESSION: ICD-10-CM

## 2021-11-02 DIAGNOSIS — F41.9 ANXIETY AND DEPRESSION: ICD-10-CM

## 2021-11-02 DIAGNOSIS — Z76.89 ENCOUNTER TO ESTABLISH CARE: ICD-10-CM

## 2021-11-02 DIAGNOSIS — Z00.00 ANNUAL PHYSICAL EXAM: Primary | ICD-10-CM

## 2021-11-02 DIAGNOSIS — R53.83 FATIGUE, UNSPECIFIED TYPE: ICD-10-CM

## 2021-11-02 PROCEDURE — 99395 PREV VISIT EST AGE 18-39: CPT | Performed by: NURSE PRACTITIONER

## 2021-11-02 PROCEDURE — 2014F MENTAL STATUS ASSESS: CPT | Performed by: NURSE PRACTITIONER

## 2021-11-02 PROCEDURE — 3008F BODY MASS INDEX DOCD: CPT | Performed by: NURSE PRACTITIONER

## 2021-11-02 RX ORDER — BUPROPION HYDROCHLORIDE 300 MG/1
300 TABLET ORAL DAILY
Qty: 30 TABLET | Refills: 5 | Status: SHIPPED | OUTPATIENT
Start: 2021-11-02 | End: 2021-12-02 | Stop reason: SDUPTHER

## 2021-11-02 RX ORDER — BUSPIRONE HYDROCHLORIDE 5 MG/1
5 TABLET ORAL 3 TIMES DAILY PRN
Qty: 90 TABLET | Refills: 5 | Status: SHIPPED | OUTPATIENT
Start: 2021-11-02 | End: 2021-12-02 | Stop reason: SDUPTHER

## 2021-11-02 NOTE — PATIENT INSTRUCTIONS
Fatigue  If you have fatigue, you feel tired all the time and have a lack of energy or a lack of motivation. Fatigue may make it difficult to start or complete tasks because of exhaustion. In general, occasional or mild fatigue is often a normal response to activity or life. However, long-lasting (chronic) or extreme fatigue may be a symptom of a medical condition.  Follow these instructions at home:  General instructions  · Watch your fatigue for any changes.  · Go to bed and get up at the same time every day.  · Avoid fatigue by pacing yourself during the day and getting enough sleep at night.  · Maintain a healthy weight.  Medicines  · Take over-the-counter and prescription medicines only as told by your health care provider.  · Take a multivitamin, if told by your health care provider.   · Do not use herbal or dietary supplements unless they are approved by your health care provider.  Activity    · Exercise regularly, as told by your health care provider.  · Use or practice techniques to help you relax, such as yoga, valerio chi, meditation, or massage therapy.    Eating and drinking    · Avoid heavy meals in the evening.  · Eat a well-balanced diet, which includes lean proteins, whole grains, plenty of fruits and vegetables, and low-fat dairy products.  · Avoid consuming too much caffeine.  · Avoid the use of alcohol.  · Drink enough fluid to keep your urine pale yellow.    Lifestyle  · Change situations that cause you stress. Try to keep your work and personal schedule in balance.  · Do not use any products that contain nicotine or tobacco, such as cigarettes and e-cigarettes. If you need help quitting, ask your health care provider.  · Do not use drugs.  Contact a health care provider if:  · Your fatigue does not get better.  · You have a fever.  · You suddenly lose or gain weight.  · You have headaches.  · You have trouble falling asleep or sleeping through the night.  · You feel angry, guilty, anxious, or  sad.  · You are unable to have a bowel movement (constipation).  · Your skin is dry.  · You have swelling in your legs or another part of your body.  Get help right away if:  · You feel confused.  · Your vision is blurry.  · You feel faint or you pass out.  · You have a severe headache.  · You have severe pain in your abdomen, your back, or the area between your waist and hips (pelvis).  · You have chest pain, shortness of breath, or an irregular or fast heartbeat.  · You are unable to urinate, or you urinate less than normal.  · You have abnormal bleeding, such as bleeding from the rectum, vagina, nose, lungs, or nipples.  · You vomit blood.  · You have thoughts about hurting yourself or others.  If you ever feel like you may hurt yourself or others, or have thoughts about taking your own life, get help right away. You can go to your nearest emergency department or call:  · Your local emergency services (911 in the [place]  · A suicide crisis helpline, such as the National Suicide Prevention Lifeline at 1-353.163.2969. This is open 24 hours a day.  Summary  · If you have fatigue, you feel tired all the time and have a lack of energy or a lack of motivation.  · Fatigue may make it difficult to start or complete tasks because of exhaustion.  · Long-lasting (chronic) or extreme fatigue may be a symptom of a medical condition.  · Exercise regularly, as told by your health care provider.  · Change situations that cause you stress. Try to keep your work and personal schedule in balance.  This information is not intended to replace advice given to you by your health care provider. Make sure you discuss any questions you have with your health care provider.  Document Revised: 07/08/2020 Document Reviewed: 09/12/2018  Elsevier Patient Education © 2021 [employer].

## 2021-11-02 NOTE — PROGRESS NOTES
CC: Establish Care and Fatigue (doesnt know if its depression, ready to go to bed after getting off work, and having no energy )      Subjective:  Joseph Jensen is a 31 y.o. male who presents for     Patient presents to office to establish care today.  Formerly a patient of MARIA TERESA Roger APRN    Is in need of a check up with labs    CMH: History of kidney stones, anxiety/depression, insomnia, fatty liver, and former smoker quit 2-3 years ago. Vapes on occasion.    Pt hasn't decided if he is going to get the COVID-19 vaccine. States he had covid last month.     Pt declines update in Tdap and Flu vaccines today.     C/O fatigue ongoing for about 3-4 months. Worse after having COVID-19 last month. Wants to sleep all the time. States he goes to work and comes home goes to bed and sleeps until time for work.    PHQ-9 is 19 and SUSANNE-7 is 13 he does have anxiety and depression going on. Denies SI/HI. Has noted that he doesn't want to do things he use to do. States has only been taking the Wellbutrin on an as needed basis and can't remember when he last took this. Takes Buspar as needed. Though, admits that it has been a while since he took this also.      The following portions of the patient's history were reviewed and updated as appropriate: allergies, current medications, past family history, past medical history, past social history, past surgical history and problem list.    Past Medical History:   Diagnosis Date   • ADHD (attention deficit hyperactivity disorder)    • Anxiety    • Headache    • Kidney stones          Current Outpatient Medications:   •  baclofen (LIORESAL) 10 MG tablet, Take 1 tablet by mouth 3 (Three) Times a Day As Needed for Muscle Spasms (muscular cramps/tension)., Disp: 90 tablet, Rfl: 0  •  buPROPion XL (WELLBUTRIN XL) 300 MG 24 hr tablet, Take 1 tablet by mouth Daily., Disp: 30 tablet, Rfl: 5  •  busPIRone (BUSPAR) 5 MG tablet, Take 1 tablet by mouth 3 (Three) Times a Day As Needed (anxiety).,  "Disp: 90 tablet, Rfl: 5    Review of Systems    Review of Systems   Constitutional: Positive for fatigue.   HENT: Negative.    Eyes: Negative.    Respiratory: Negative.    Cardiovascular: Negative.    Gastrointestinal: Negative.    Endocrine: Negative.    Genitourinary: Negative.    Musculoskeletal: Negative.    Skin: Negative.    Allergic/Immunologic: Negative.    Neurological: Negative.    Hematological: Negative.    Psychiatric/Behavioral: Negative.    All other systems reviewed and are negative.      Objective  Vitals:    11/02/21 1348   BP: 124/90   Pulse: 92   SpO2: 98%   Weight: 111 kg (244 lb 12.8 oz)   Height: 172.7 cm (68\")     Body mass index is 37.22 kg/m².    Physical Exam    Physical Exam  Vitals and nursing note reviewed.   Constitutional:       General: He is not in acute distress.     Appearance: He is well-developed.   HENT:      Head: Normocephalic and atraumatic.      Right Ear: External ear normal.      Left Ear: External ear normal.   Eyes:      General: No scleral icterus.        Right eye: No discharge.         Left eye: No discharge.      Extraocular Movements: Extraocular movements intact.      Conjunctiva/sclera: Conjunctivae normal.   Cardiovascular:      Rate and Rhythm: Normal rate and regular rhythm.      Pulses: Normal pulses.      Heart sounds: Normal heart sounds. No murmur heard.  No friction rub. No gallop.    Pulmonary:      Effort: Pulmonary effort is normal. No respiratory distress.      Breath sounds: Normal breath sounds. No stridor. No wheezing, rhonchi or rales.   Chest:      Chest wall: No tenderness.   Abdominal:      General: Bowel sounds are normal. There is no distension.      Palpations: Abdomen is soft. There is no mass.      Tenderness: There is abdominal tenderness (Mild generalized tenderness). There is no guarding or rebound.      Hernia: No hernia is present.   Musculoskeletal:      Cervical back: Normal range of motion and neck supple. No rigidity or " tenderness.      Right lower leg: No edema.      Left lower leg: No edema.   Lymphadenopathy:      Cervical: No cervical adenopathy.   Skin:     General: Skin is warm and dry.      Capillary Refill: Capillary refill takes less than 2 seconds.      Coloration: Skin is not jaundiced.      Findings: No rash.   Neurological:      General: No focal deficit present.      Mental Status: He is alert and oriented to person, place, and time. Mental status is at baseline.   Psychiatric:         Attention and Perception: Attention normal.         Mood and Affect: Mood normal.         Speech: Speech normal.         Behavior: Behavior normal.         Thought Content: Thought content normal.         Cognition and Memory: Cognition normal.         Judgment: Judgment normal.      Comments: Well kempt, good eye contact. Answers questions appropriately.             Diagnoses and all orders for this visit:    1. Annual physical exam (Primary)  -     CBC w AUTO Differential; Future  -     Comprehensive metabolic panel; Future  -     TSH  -     Lipid Panel With LDL/HDL Ratio; Future  -     Hemoglobin A1c  -     Hepatitis C antibody; Future    2. Encounter to establish care    3. Anxiety and depression  -     buPROPion XL (WELLBUTRIN XL) 300 MG 24 hr tablet; Take 1 tablet by mouth Daily.  Dispense: 30 tablet; Refill: 5  -     busPIRone (BUSPAR) 5 MG tablet; Take 1 tablet by mouth 3 (Three) Times a Day As Needed (anxiety).  Dispense: 90 tablet; Refill: 5    4. Fatty liver    5. Fatigue, unspecified type  -     CBC w AUTO Differential; Future  -     TSH  -     Iron and TIBC; Future  -     Ferritin; Future  -     Folate; Future  -     Vitamin B12; Future  -     Vitamin D 25 Hydroxy; Future  -     Testosterone (Free & Total), LC / MS; Future       Annual physical exam performed today we will check routine labs with CBC, CMP, TSH, lipid panel, A1c, and hepatitis C antibody.  Patient to return at a later date fasting for these labs.  For the  anxiety and depression patient encouraged to take his Wellbutrin daily instead of as needed to see if this will help with the depression and anxiety.  Advised to take his BuSpar as needed.  These were both refilled today.  For the fatigue we will check a CBC, TSH, iron, TIBC, ferritin, folate, vitamin B12, vitamin D, and testosterone levels.  Will inform patient of these results once they are available.  Patient advised that some of the fatigue may be due to the COVID-19 virus.  Patient declined Tdap or flu shot today while in office.  Patient to follow-up in 1 month to reassess the fatigue and anxiety and depression.  Answered all questions.  Patient verbalized understanding of plan of care.          This document has been electronically signed by AYAAN Pennington on November 2, 2021 14:17 CDT

## 2021-11-05 ENCOUNTER — LAB (OUTPATIENT)
Dept: LAB | Facility: OTHER | Age: 31
End: 2021-11-05

## 2021-11-05 DIAGNOSIS — Z00.00 ANNUAL PHYSICAL EXAM: ICD-10-CM

## 2021-11-05 DIAGNOSIS — R53.83 FATIGUE, UNSPECIFIED TYPE: ICD-10-CM

## 2021-11-05 LAB
25(OH)D3 SERPL-MCNC: 29.6 NG/ML (ref 30–100)
ALBUMIN SERPL-MCNC: 5.2 G/DL (ref 3.5–5)
ALBUMIN/GLOB SERPL: 1.5 G/DL (ref 1.1–1.8)
ALP SERPL-CCNC: 74 U/L (ref 38–126)
ALT SERPL W P-5'-P-CCNC: 253 U/L
ANION GAP SERPL CALCULATED.3IONS-SCNC: 9 MMOL/L (ref 5–15)
AST SERPL-CCNC: 131 U/L (ref 17–59)
BASOPHILS # BLD AUTO: 0.09 10*3/MM3 (ref 0–0.2)
BASOPHILS NFR BLD AUTO: 0.9 % (ref 0–1.5)
BILIRUB SERPL-MCNC: 0.7 MG/DL (ref 0.2–1.3)
BUN SERPL-MCNC: 12 MG/DL (ref 7–23)
BUN/CREAT SERPL: 12.5 (ref 7–25)
CALCIUM SPEC-SCNC: 10.2 MG/DL (ref 8.4–10.2)
CHLORIDE SERPL-SCNC: 104 MMOL/L (ref 101–112)
CHOLEST SERPL-MCNC: 161 MG/DL (ref 0–200)
CO2 SERPL-SCNC: 26 MMOL/L (ref 22–30)
CREAT SERPL-MCNC: 0.96 MG/DL (ref 0.7–1.3)
DEPRECATED RDW RBC AUTO: 42.3 FL (ref 37–54)
EOSINOPHIL # BLD AUTO: 0.07 10*3/MM3 (ref 0–0.4)
EOSINOPHIL NFR BLD AUTO: 0.7 % (ref 0.3–6.2)
ERYTHROCYTE [DISTWIDTH] IN BLOOD BY AUTOMATED COUNT: 13.7 % (ref 12.3–15.4)
FERRITIN SERPL-MCNC: 386 NG/ML (ref 30–400)
FOLATE SERPL-MCNC: 9.68 NG/ML (ref 4.78–24.2)
GFR SERPL CREATININE-BSD FRML MDRD: 92 ML/MIN/1.73 (ref 70–162)
GLOBULIN UR ELPH-MCNC: 3.5 GM/DL (ref 2.3–3.5)
GLUCOSE SERPL-MCNC: 116 MG/DL (ref 70–99)
HBA1C MFR BLD: 5.65 % (ref 4.8–5.6)
HCT VFR BLD AUTO: 46.3 % (ref 37.5–51)
HCV AB SER DONR QL: NORMAL
HDLC SERPL-MCNC: 43 MG/DL (ref 40–60)
HGB BLD-MCNC: 15.4 G/DL (ref 13–17.7)
IRON 24H UR-MRATE: 92 MCG/DL (ref 59–158)
IRON SATN MFR SERPL: 20 % (ref 20–50)
LDLC SERPL CALC-MCNC: 96 MG/DL (ref 0–100)
LDLC/HDLC SERPL: 2.18 {RATIO}
LYMPHOCYTES # BLD AUTO: 2.06 10*3/MM3 (ref 0.7–3.1)
LYMPHOCYTES NFR BLD AUTO: 19.9 % (ref 19.6–45.3)
MCH RBC QN AUTO: 28.3 PG (ref 26.6–33)
MCHC RBC AUTO-ENTMCNC: 33.3 G/DL (ref 31.5–35.7)
MCV RBC AUTO: 85.1 FL (ref 79–97)
MONOCYTES # BLD AUTO: 0.81 10*3/MM3 (ref 0.1–0.9)
MONOCYTES NFR BLD AUTO: 7.8 % (ref 5–12)
NEUTROPHILS NFR BLD AUTO: 7.33 10*3/MM3 (ref 1.7–7)
NEUTROPHILS NFR BLD AUTO: 70.7 % (ref 42.7–76)
PLATELET # BLD AUTO: 244 10*3/MM3 (ref 140–450)
PMV BLD AUTO: 11.3 FL (ref 6–12)
POTASSIUM SERPL-SCNC: 4.3 MMOL/L (ref 3.4–5)
PROT SERPL-MCNC: 8.7 G/DL (ref 6.3–8.6)
RBC # BLD AUTO: 5.44 10*6/MM3 (ref 4.14–5.8)
SODIUM SERPL-SCNC: 139 MMOL/L (ref 137–145)
TIBC SERPL-MCNC: 456 MCG/DL (ref 298–536)
TRANSFERRIN SERPL-MCNC: 306 MG/DL (ref 200–360)
TRIGL SERPL-MCNC: 122 MG/DL (ref 0–150)
TSH SERPL DL<=0.05 MIU/L-ACNC: 1.7 UIU/ML (ref 0.27–4.2)
VIT B12 BLD-MCNC: 513 PG/ML (ref 211–946)
VLDLC SERPL-MCNC: 22 MG/DL (ref 5–40)
WBC # BLD AUTO: 10.36 10*3/MM3 (ref 3.4–10.8)

## 2021-11-05 PROCEDURE — 84402 ASSAY OF FREE TESTOSTERONE: CPT | Performed by: NURSE PRACTITIONER

## 2021-11-05 PROCEDURE — 84443 ASSAY THYROID STIM HORMONE: CPT | Performed by: NURSE PRACTITIONER

## 2021-11-05 PROCEDURE — 85025 COMPLETE CBC W/AUTO DIFF WBC: CPT | Performed by: NURSE PRACTITIONER

## 2021-11-05 PROCEDURE — 82607 VITAMIN B-12: CPT | Performed by: NURSE PRACTITIONER

## 2021-11-05 PROCEDURE — 80061 LIPID PANEL: CPT | Performed by: NURSE PRACTITIONER

## 2021-11-05 PROCEDURE — 82728 ASSAY OF FERRITIN: CPT | Performed by: NURSE PRACTITIONER

## 2021-11-05 PROCEDURE — 83036 HEMOGLOBIN GLYCOSYLATED A1C: CPT | Performed by: NURSE PRACTITIONER

## 2021-11-05 PROCEDURE — 80053 COMPREHEN METABOLIC PANEL: CPT | Performed by: NURSE PRACTITIONER

## 2021-11-05 PROCEDURE — 82306 VITAMIN D 25 HYDROXY: CPT | Performed by: NURSE PRACTITIONER

## 2021-11-05 PROCEDURE — 83540 ASSAY OF IRON: CPT | Performed by: NURSE PRACTITIONER

## 2021-11-05 PROCEDURE — 82746 ASSAY OF FOLIC ACID SERUM: CPT | Performed by: NURSE PRACTITIONER

## 2021-11-05 PROCEDURE — 86803 HEPATITIS C AB TEST: CPT | Performed by: NURSE PRACTITIONER

## 2021-11-05 PROCEDURE — 84466 ASSAY OF TRANSFERRIN: CPT | Performed by: NURSE PRACTITIONER

## 2021-11-05 PROCEDURE — 84403 ASSAY OF TOTAL TESTOSTERONE: CPT | Performed by: NURSE PRACTITIONER

## 2021-11-08 DIAGNOSIS — R74.8 ELEVATED LIVER ENZYMES: Primary | ICD-10-CM

## 2021-11-08 RX ORDER — ERGOCALCIFEROL 1.25 MG/1
50000 CAPSULE ORAL WEEKLY
Qty: 5 CAPSULE | Refills: 1 | Status: SHIPPED | OUTPATIENT
Start: 2021-11-08 | End: 2022-06-04 | Stop reason: SDUPTHER

## 2021-11-10 LAB
TESTOST FREE SERPL-MCNC: 15.6 PG/ML (ref 8.7–25.1)
TESTOST SERPL-MCNC: 404.2 NG/DL (ref 264–916)

## 2021-12-02 ENCOUNTER — LAB (OUTPATIENT)
Dept: LAB | Facility: OTHER | Age: 31
End: 2021-12-02

## 2021-12-02 ENCOUNTER — OFFICE VISIT (OUTPATIENT)
Dept: FAMILY MEDICINE CLINIC | Facility: CLINIC | Age: 31
End: 2021-12-02

## 2021-12-02 VITALS
DIASTOLIC BLOOD PRESSURE: 82 MMHG | BODY MASS INDEX: 37.31 KG/M2 | HEIGHT: 68 IN | SYSTOLIC BLOOD PRESSURE: 126 MMHG | OXYGEN SATURATION: 98 % | WEIGHT: 246.2 LBS | HEART RATE: 103 BPM

## 2021-12-02 DIAGNOSIS — F41.9 ANXIETY AND DEPRESSION: Primary | Chronic | ICD-10-CM

## 2021-12-02 DIAGNOSIS — R74.8 ELEVATED LIVER ENZYMES: ICD-10-CM

## 2021-12-02 DIAGNOSIS — F32.A ANXIETY AND DEPRESSION: Primary | Chronic | ICD-10-CM

## 2021-12-02 PROCEDURE — 86704 HEP B CORE ANTIBODY TOTAL: CPT | Performed by: NURSE PRACTITIONER

## 2021-12-02 PROCEDURE — 86803 HEPATITIS C AB TEST: CPT | Performed by: NURSE PRACTITIONER

## 2021-12-02 PROCEDURE — 99213 OFFICE O/P EST LOW 20 MIN: CPT | Performed by: NURSE PRACTITIONER

## 2021-12-02 PROCEDURE — 87340 HEPATITIS B SURFACE AG IA: CPT | Performed by: NURSE PRACTITIONER

## 2021-12-02 PROCEDURE — 86708 HEPATITIS A ANTIBODY: CPT | Performed by: NURSE PRACTITIONER

## 2021-12-02 PROCEDURE — 86706 HEP B SURFACE ANTIBODY: CPT | Performed by: NURSE PRACTITIONER

## 2021-12-02 RX ORDER — BUSPIRONE HYDROCHLORIDE 5 MG/1
5 TABLET ORAL 3 TIMES DAILY PRN
Qty: 90 TABLET | Refills: 5 | Status: SHIPPED | OUTPATIENT
Start: 2021-12-02 | End: 2023-02-04 | Stop reason: SDUPTHER

## 2021-12-02 RX ORDER — BUPROPION HYDROCHLORIDE 300 MG/1
300 TABLET ORAL DAILY
Qty: 30 TABLET | Refills: 5 | Status: SHIPPED | OUTPATIENT
Start: 2021-12-02 | End: 2022-06-04 | Stop reason: SDUPTHER

## 2021-12-02 NOTE — PROGRESS NOTES
CC: Anxiety (1 month FU), Depression, and Memory Loss (having trouble remembering things)      Subjective:  Joseph Jensen is a 31 y.o. male who presents for     Patient presents to office for 1 month follow-up on anxiety and depression.  Patient was advised to start taking his Wellbutrin as ordered at last appointment and to take the BuSpar 3 times daily as needed anxiety. Pt feels the medication is helping. Pt still has fatigue, but has gotten over feelings of not wanting to do anything. States has gotten back into his music and video games like he has in the past. Denies SI/HI. States has problems with memory, but this has been ongoing for years. States when he was 16 or 17 fell out of a hay loft and hit his head on a metal watering can.       The following portions of the patient's history were reviewed and updated as appropriate: allergies, current medications, past family history, past medical history, past social history, past surgical history and problem list.    Past Medical History:   Diagnosis Date   • ADHD (attention deficit hyperactivity disorder)    • Anxiety    • Headache    • Kidney stones          Current Outpatient Medications:   •  baclofen (LIORESAL) 10 MG tablet, Take 1 tablet by mouth 3 (Three) Times a Day As Needed for Muscle Spasms (muscular cramps/tension)., Disp: 90 tablet, Rfl: 0  •  buPROPion XL (WELLBUTRIN XL) 300 MG 24 hr tablet, Take 1 tablet by mouth Daily., Disp: 30 tablet, Rfl: 5  •  busPIRone (BUSPAR) 5 MG tablet, Take 1 tablet by mouth 3 (Three) Times a Day As Needed (anxiety)., Disp: 90 tablet, Rfl: 5  •  vitamin D (ERGOCALCIFEROL) 1.25 MG (75637 UT) capsule capsule, Take 1 capsule by mouth 1 (One) Time Per Week., Disp: 5 capsule, Rfl: 1    Review of Systems    Review of Systems   Constitutional: Positive for fatigue.   HENT: Negative.    Eyes: Negative.    Respiratory: Negative.    Cardiovascular: Positive for leg swelling.   Gastrointestinal: Negative.    Endocrine: Negative.  "   Genitourinary: Negative.    Musculoskeletal: Positive for back pain.   Skin: Negative.    Allergic/Immunologic: Negative.    Neurological: Negative.    Hematological: Negative.    Psychiatric/Behavioral: Positive for agitation and decreased concentration. Negative for hallucinations, self-injury, sleep disturbance and suicidal ideas. The patient is nervous/anxious. The patient is not hyperactive.    All other systems reviewed and are negative.      Objective  Vitals:    12/02/21 1252   BP: 126/82   Pulse: 103   SpO2: 98%   Weight: 112 kg (246 lb 3.2 oz)   Height: 172.7 cm (68\")     Body mass index is 37.43 kg/m².    Physical Exam    Physical Exam  Vitals and nursing note reviewed.   Constitutional:       General: He is not in acute distress.     Appearance: Normal appearance. He is not ill-appearing, toxic-appearing or diaphoretic.   HENT:      Head: Normocephalic and atraumatic.   Cardiovascular:      Rate and Rhythm: Normal rate and regular rhythm.      Pulses: Normal pulses.      Heart sounds: Normal heart sounds. No murmur heard.  No friction rub. No gallop.    Pulmonary:      Effort: Pulmonary effort is normal. No respiratory distress.      Breath sounds: Normal breath sounds. No stridor. No wheezing, rhonchi or rales.   Chest:      Chest wall: No tenderness.   Musculoskeletal:      Cervical back: Normal range of motion and neck supple. No rigidity or tenderness.      Right lower leg: No edema.      Left lower leg: No edema.   Lymphadenopathy:      Cervical: No cervical adenopathy.   Skin:     General: Skin is warm and dry.      Capillary Refill: Capillary refill takes less than 2 seconds.      Findings: No rash.   Neurological:      Mental Status: He is alert.   Psychiatric:         Attention and Perception: Attention and perception normal.         Mood and Affect: Mood normal.         Speech: Speech normal.         Behavior: Behavior normal. Behavior is cooperative.         Thought Content: Thought content " normal.         Cognition and Memory: Cognition and memory normal.         Judgment: Judgment normal.      Comments: Well kempt, good eye contact, answers questions appropriately             Diagnoses and all orders for this visit:    1. Anxiety and depression (Primary)  -     buPROPion XL (WELLBUTRIN XL) 300 MG 24 hr tablet; Take 1 tablet by mouth Daily.  Dispense: 30 tablet; Refill: 5  -     busPIRone (BUSPAR) 5 MG tablet; Take 1 tablet by mouth 3 (Three) Times a Day As Needed (anxiety).  Dispense: 90 tablet; Refill: 5       Patient is doing better overall.  PHQ-9 score is 12 SUSANNE-7 score is 8 which is improved from previous check 1 month ago.  Patient advised to continue his current medications.  He does have an appointment with Dr. Canela this month for the elevated liver enzymes.  Patient advised to keep this appointment.  He is to follow-up here with me in 6 months or sooner if needed.  Answered all questions.  Patient verbalized understanding of plan of care.        This document has been electronically signed by AYAAN Pennington on December 2, 2021 13:07 CST

## 2021-12-02 NOTE — PATIENT INSTRUCTIONS
Depression and Anxiety  You will learn about mood disorders, how these conditions can occur in people with heart disease, and why it is important to treat these conditions.  To view the content, go to this web address:  [email or web address]    This video will  on: 2023. If you need access to this video following this date, please reach out to the healthcare provider who assigned it to you.  This information is not intended to replace advice given to you by your health care provider. Make sure you discuss any questions you have with your health care provider.  [employer]’s editorial and clinical teams regularly review and update content to ensure it is up-to-date with changing practice standards and recognized medical guidelines.  Document Revised: 2021  [employer] Patient Education ©  [employer]

## 2021-12-03 LAB
HBV SURFACE AB SER RIA-ACNC: NORMAL
HBV SURFACE AG SERPL QL IA: NORMAL
HCV AB SER DONR QL: NORMAL

## 2021-12-04 LAB
HAV AB SER QL IA: NEGATIVE
HBV CORE AB SERPL QL IA: NEGATIVE

## 2022-01-20 ENCOUNTER — OFFICE VISIT (OUTPATIENT)
Dept: GASTROENTEROLOGY | Facility: CLINIC | Age: 32
End: 2022-01-20

## 2022-01-20 VITALS
BODY MASS INDEX: 36.56 KG/M2 | DIASTOLIC BLOOD PRESSURE: 87 MMHG | HEIGHT: 68 IN | HEART RATE: 82 BPM | SYSTOLIC BLOOD PRESSURE: 137 MMHG | WEIGHT: 241.2 LBS

## 2022-01-20 DIAGNOSIS — K76.0 FATTY LIVER: ICD-10-CM

## 2022-01-20 DIAGNOSIS — K75.81 NASH (NONALCOHOLIC STEATOHEPATITIS): Primary | ICD-10-CM

## 2022-01-20 PROCEDURE — 99204 OFFICE O/P NEW MOD 45 MIN: CPT | Performed by: INTERNAL MEDICINE

## 2022-01-20 NOTE — PATIENT INSTRUCTIONS
"BMI for Adults  What is BMI?  Body mass index (BMI) is a number that is calculated from a person's weight and height. BMI can help estimate how much of a person's weight is composed of fat. BMI does not measure body fat directly. Rather, it is an alternative to procedures that directly measure body fat, which can be difficult and expensive.  BMI can help identify people who may be at higher risk for certain medical problems.  What are BMI measurements used for?  BMI is used as a screening tool to identify possible weight problems. It helps determine whether a person is obese, overweight, a healthy weight, or underweight.  BMI is useful for:  · Identifying a weight problem that may be related to a medical condition or may increase the risk for medical problems.  · Promoting changes, such as changes in diet and exercise, to help reach a healthy weight. BMI screening can be repeated to see if these changes are working.  How is BMI calculated?  BMI involves measuring your weight in relation to your height. Both height and weight are measured, and the BMI is calculated from those numbers. This can be done either in English ([place] or metric measurements. Note that charts and online BMI calculators are available to help you find your BMI quickly and easily without having to do these calculations yourself.  To calculate your BMI in English ([place] measurements:    1. Measure your weight in pounds (lb).  2. Multiply the number of pounds by 703.  ? For example, for a person who weighs 180 lb, multiply that number by 703, which equals 126,540.  3. Measure your height in inches. Then multiply that number by itself to get a measurement called \"inches squared.\"  ? For example, for a person who is 70 inches tall, the \"inches squared\" measurement is 70 inches x 70 inches, which equals 4,900 inches squared.  4. Divide the total from step 2 (number of lb x 703) by the total from step 3 (inches squared): 126,540 ÷ 4,900 = 25.8. This is " "your BMI.    To calculate your BMI in metric measurements:  1. Measure your weight in kilograms (kg).  2. Measure your height in meters (m). Then multiply that number by itself to get a measurement called \"meters squared.\"  ? For example, for a person who is 1.75 m tall, the \"meters squared\" measurement is 1.75 m x 1.75 m, which is equal to 3.1 meters squared.  3. Divide the number of kilograms (your weight) by the meters squared number. In this example: 70 ÷ 3.1 = 22.6. This is your BMI.  What do the results mean?  BMI charts are used to identify whether you are underweight, normal weight, overweight, or obese. The following guidelines will be used:  · Underweight: BMI less than 18.5.  · Normal weight: BMI between 18.5 and 24.9.  · Overweight: BMI between 25 and 29.9.  · Obese: BMI of 30 or above.  Keep these notes in mind:  · Weight includes both fat and muscle, so someone with a muscular build, such as an athlete, may have a BMI that is higher than 24.9. In cases like these, BMI is not an accurate measure of body fat.  · To determine if excess body fat is the cause of a BMI of 25 or higher, further assessments may need to be done by a health care provider.  · BMI is usually interpreted in the same way for men and women.  Where to find more information  For more information about BMI, including tools to quickly calculate your BMI, go to these websites:  · Centers for Disease Control and Prevention: www.cdc.gov  · American Heart Association: www.heart.org  · National Heart, Lung, and Blood [place]: www.nhlbi.nih.gov  Summary  · Body mass index (BMI) is a number that is calculated from a person's weight and height.  · BMI may help estimate how much of a person's weight is composed of fat. BMI can help identify those who may be at higher risk for certain medical problems.  · BMI can be measured using English measurements or metric measurements.  · BMI charts are used to identify whether you are underweight, normal " weight, overweight, or obese.  This information is not intended to replace advice given to you by your health care provider. Make sure you discuss any questions you have with your health care provider.  Document Revised: 09/09/2020 Document Reviewed: 07/17/2020  [employer] Patient Education © 2021 [employer]

## 2022-01-20 NOTE — PROGRESS NOTES
[facility]      Chief Complaint:   Chief Complaint   Patient presents with   • Elevated Hepatic Enzymes       Subjective     HPI:   Patient with elevated LFTs.  Patient has never had these previously.  Patient has had hepatitis profile done but other work-up has not been completed patient's ultrasound does show fatty liver.  Patient is moderately obese.  Patient denies any right upper quadrant abdominal pain.  Patient's LFTs are slightly elevated.  Patient also complained of change in bowel habits over the past 3 days but has never had this previously.  Discussed with patient that if the symptoms continue we will need to see him next week but otherwise will have patient lose weight over the next 3 months and have follow-up in office with repeat lab work in 3 months.    Plan; outpatient follow-up in 3 months we will have patient lose weight over the period of time    Past Medical History:   Past Medical History:   Diagnosis Date   • ADHD (attention deficit hyperactivity disorder)    • Anxiety    • Headache    • Kidney stones        Past Surgical History:    Past Surgical History:   Procedure Laterality Date   • KIDNEY STONE SURGERY  07/01/2021       Family History:  Family History   Problem Relation Age of Onset   • Arthritis Maternal Grandmother    • COPD Maternal Grandmother    • Developmental Disability Maternal Grandmother    • Hypertension Maternal Grandmother    • Liver disease Maternal Grandmother    • Arthritis Maternal Grandfather    • COPD Maternal Grandfather    • Developmental Disability Maternal Grandfather    • Hypertension Maternal Grandfather    • Liver disease Maternal Grandfather    • Liver disease Maternal Uncle        Social History:   reports that he quit smoking about 4 years ago. He has a 2.50 pack-year smoking history. He has never used smokeless tobacco. He reports current drug use. Frequency: 2.00 times per week. Drug: Marijuana. He reports that he does not drink  "alcohol.    Medications:   Prior to Admission medications    Medication Sig Start Date End Date Taking? Authorizing Provider   baclofen (LIORESAL) 10 MG tablet Take 1 tablet by mouth 3 (Three) Times a Day As Needed for Muscle Spasms (muscular cramps/tension). 1/18/21  Yes Yesica Roger APRN   buPROPion XL (WELLBUTRIN XL) 300 MG 24 hr tablet Take 1 tablet by mouth Daily. 12/2/21  Yes Teresita Do APRN   busPIRone (BUSPAR) 5 MG tablet Take 1 tablet by mouth 3 (Three) Times a Day As Needed (anxiety). 12/2/21  Yes Teresita Do APRN   vitamin D (ERGOCALCIFEROL) 1.25 MG (97194 UT) capsule capsule Take 1 capsule by mouth 1 (One) Time Per Week. 11/8/21  Yes Terseita Do APRN       Allergies:  Benadryl [diphenhydramine]    ROS:    Review of Systems   Constitutional: Negative for activity change, appetite change and unexpected weight change.   HENT: Negative for congestion, sore throat and trouble swallowing.    Respiratory: Negative for cough, choking and shortness of breath.    Cardiovascular: Negative for chest pain.   Gastrointestinal: Positive for abdominal pain and diarrhea. Negative for abdominal distention, anal bleeding, blood in stool, constipation, nausea, rectal pain and vomiting.   Endocrine: Negative for heat intolerance, polydipsia and polyphagia.   Genitourinary: Negative for difficulty urinating.   Musculoskeletal: Negative for arthralgias.   Skin: Negative for color change, pallor, rash and wound.   Allergic/Immunologic: Negative for food allergies.   Neurological: Negative for dizziness, syncope, weakness and headaches.   Psychiatric/Behavioral: Negative for agitation, behavioral problems, confusion and decreased concentration.     Objective     Blood pressure 137/87, pulse 82, height 172.7 cm (68\"), weight 109 kg (241 lb 3.2 oz).    Physical Exam  Constitutional:       General: He is not in acute distress.     Appearance: He is well-developed. He is not diaphoretic. "   HENT:      Head: Normocephalic and atraumatic.   Cardiovascular:      Rate and Rhythm: Normal rate and regular rhythm.      Heart sounds: Normal heart sounds. No murmur heard.  No friction rub. No gallop.    Pulmonary:      Effort: No respiratory distress.      Breath sounds: Normal breath sounds. No wheezing or rales.   Chest:      Chest wall: No tenderness.   Abdominal:      General: Bowel sounds are normal. There is no distension.      Palpations: Abdomen is soft. There is no mass.      Tenderness: There is abdominal tenderness. There is no guarding or rebound.      Hernia: No hernia is present.   Musculoskeletal:         General: Normal range of motion.   Skin:     General: Skin is warm and dry.      Coloration: Skin is not pale.      Findings: No erythema or rash.   Neurological:      Mental Status: He is alert and oriented to person, place, and time.   Psychiatric:         Behavior: Behavior normal.         Thought Content: Thought content normal.         Judgment: Judgment normal.          Assessment/Plan   Diagnoses and all orders for this visit:    1. WILKERSON (nonalcoholic steatohepatitis) (Primary)  -     Alpha - 1 - Antitrypsin; Future  -     SLIME; Future  -     Anti-Smooth Muscle Antibody Titer; Future  -     Ceruloplasmin; Future  -     Mitochondrial Antibodies, M2; Future  -     WILKERSON Fibrosure; Future  -     Iron Profile; Future    2. Fatty liver        * Surgery not found *     Diagnosis Plan   1. WILKERSON (nonalcoholic steatohepatitis)  Alpha - 1 - Antitrypsin    SLIME    Anti-Smooth Muscle Antibody Titer    Ceruloplasmin    Mitochondrial Antibodies, M2    WILKERSON Fibrosure    Iron Profile   2. Fatty liver         Anticipated Surgical Procedure:  Orders Placed This Encounter   Procedures   • Alpha - 1 - Antitrypsin     Standing Status:   Future     Order Specific Question:   Release to patient     Answer:   Immediate   • SLIME     Standing Status:   Future     Order Specific Question:   Release to patient      Answer:   Immediate   • Anti-Smooth Muscle Antibody Titer     Standing Status:   Future     Order Specific Question:   Release to patient     Answer:   Immediate   • Ceruloplasmin     Standing Status:   Future     Order Specific Question:   Release to patient     Answer:   Immediate   • Mitochondrial Antibodies, M2     Standing Status:   Future     Order Specific Question:   Release to patient     Answer:   Immediate   • WILKERSON Fibrosure     Standing Status:   Future     Order Specific Question:   Release to patient     Answer:   Immediate   • Iron Profile     Standing Status:   Future       The risks, benefits, and alternatives of this procedure have been discussed with the patient or the responsible party- the patient understands and agrees to proceed.

## 2022-01-27 ENCOUNTER — TELEPHONE (OUTPATIENT)
Dept: GASTROENTEROLOGY | Facility: CLINIC | Age: 32
End: 2022-01-27

## 2022-01-27 NOTE — TELEPHONE ENCOUNTER
----- Message from Gaviota Palmer MA sent at 1/27/2022  2:42 PM CST -----  Patient stated he was to call if he did not feel better after his visit last week. He stated he still does not feel well and is still in pain please call [phone]

## 2022-01-27 NOTE — TELEPHONE ENCOUNTER
01/27/2022, 1549 - Patient telephone call returned per this staff member [phone].  Patient stated he continues to experience abdominal pain and lack of bowel movements.  Patient stated he has been diagnosed with a Kidney Stone per his Urologist.  Patient accepted offer of clinical appointment with Dr. Romario Hatfield M.D. on Friday, January 28, 2022 with patient requesting late clinical appointment time.  Patient offered and accepted 4:00 P.M.

## 2022-01-28 ENCOUNTER — OFFICE VISIT (OUTPATIENT)
Dept: GASTROENTEROLOGY | Facility: CLINIC | Age: 32
End: 2022-01-28

## 2022-01-28 VITALS
HEIGHT: 68 IN | HEART RATE: 85 BPM | WEIGHT: 238.2 LBS | DIASTOLIC BLOOD PRESSURE: 87 MMHG | SYSTOLIC BLOOD PRESSURE: 137 MMHG | BODY MASS INDEX: 36.1 KG/M2

## 2022-01-28 DIAGNOSIS — R19.7 DIARRHEA, UNSPECIFIED TYPE: ICD-10-CM

## 2022-01-28 DIAGNOSIS — R10.11 RIGHT UPPER QUADRANT ABDOMINAL PAIN: Primary | ICD-10-CM

## 2022-01-28 PROCEDURE — 99214 OFFICE O/P EST MOD 30 MIN: CPT | Performed by: INTERNAL MEDICINE

## 2022-01-28 PROCEDURE — U0003 INFECTIOUS AGENT DETECTION BY NUCLEIC ACID (DNA OR RNA); SEVERE ACUTE RESPIRATORY SYNDROME CORONAVIRUS 2 (SARS-COV-2) (CORONAVIRUS DISEASE [COVID-19]), AMPLIFIED PROBE TECHNIQUE, MAKING USE OF HIGH THROUGHPUT TECHNOLOGIES AS DESCRIBED BY CMS-2020-01-R: HCPCS | Performed by: FAMILY MEDICINE

## 2022-01-28 RX ORDER — SODIUM, POTASSIUM,MAG SULFATES 17.5-3.13G
1 SOLUTION, RECONSTITUTED, ORAL ORAL EVERY 12 HOURS
Qty: 1 ML | Refills: 0 | Status: SHIPPED | OUTPATIENT
Start: 2022-01-28 | End: 2022-01-28

## 2022-01-28 RX ORDER — DEXTROSE AND SODIUM CHLORIDE 5; .45 G/100ML; G/100ML
30 INJECTION, SOLUTION INTRAVENOUS CONTINUOUS PRN
Status: CANCELLED | OUTPATIENT
Start: 2022-01-31

## 2022-01-28 NOTE — PROGRESS NOTES
[facility]      Chief Complaint:   Chief Complaint   Patient presents with   • Lindsay   • Abdominal Pain   • Constipation       Subjective     HPI:   Patient with abdominal pain.  Patient also has kidney stone and was blaming this for his pain but his kidney stone is on his left and his pain is in his right upper quadrant.  Patient also with Lindsay syndrome.  Patient has had changes in bowel habits with episodes diarrhea and constipation.  With this change in bowel habits and diarrhea we will schedule patient for EGD and colonoscopy to evaluate.    Plan; we will have patient follow-up after EGD and colonoscopy  Past Medical History:   Past Medical History:   Diagnosis Date   • ADHD (attention deficit hyperactivity disorder)    • Anxiety    • Headache    • Kidney stones        Past Surgical History:    Past Surgical History:   Procedure Laterality Date   • KIDNEY STONE SURGERY  07/01/2021       Family History:  Family History   Problem Relation Age of Onset   • Arthritis Maternal Grandmother    • COPD Maternal Grandmother    • Developmental Disability Maternal Grandmother    • Hypertension Maternal Grandmother    • Liver disease Maternal Grandmother    • Arthritis Maternal Grandfather    • COPD Maternal Grandfather    • Developmental Disability Maternal Grandfather    • Hypertension Maternal Grandfather    • Liver disease Maternal Grandfather    • Liver disease Maternal Uncle        Social History:   reports that he quit smoking about 4 years ago. He has a 2.50 pack-year smoking history. He has never used smokeless tobacco. He reports current drug use. Frequency: 2.00 times per week. Drug: Marijuana. He reports that he does not drink alcohol.    Medications:   Prior to Admission medications    Medication Sig Start Date End Date Taking? Authorizing Provider   baclofen (LIORESAL) 10 MG tablet Take 1 tablet by mouth 3 (Three) Times a Day As Needed for Muscle Spasms (muscular cramps/tension). 1/18/21  " Yes Yesica Roger APRN   buPROPion XL (WELLBUTRIN XL) 300 MG 24 hr tablet Take 1 tablet by mouth Daily. 12/2/21  Yes Teresita Do APRN   busPIRone (BUSPAR) 5 MG tablet Take 1 tablet by mouth 3 (Three) Times a Day As Needed (anxiety). 12/2/21  Yes Teresita Do APRN   vitamin D (ERGOCALCIFEROL) 1.25 MG (97755 UT) capsule capsule Take 1 capsule by mouth 1 (One) Time Per Week. 11/8/21  Yes Teresita Do APRN   sodium-potassium-magnesium sulfates (SUPREP) 17.5-3.13-1.6 GM/177ML solution oral solution Take 1 bottle by mouth Every 12 (Twelve) Hours. 1/28/22   Romario Canela MD       Allergies:  Benadryl [diphenhydramine]    ROS:    Review of Systems   Constitutional: Negative for activity change, appetite change and unexpected weight change.   HENT: Negative for congestion, sore throat and trouble swallowing.    Respiratory: Negative for cough, choking and shortness of breath.    Cardiovascular: Negative for chest pain.   Gastrointestinal: Negative for abdominal distention, abdominal pain, anal bleeding, blood in stool, constipation, diarrhea, nausea, rectal pain and vomiting.   Endocrine: Negative for heat intolerance, polydipsia and polyphagia.   Genitourinary: Negative for difficulty urinating.   Musculoskeletal: Negative for arthralgias.   Skin: Negative for color change, pallor, rash and wound.   Allergic/Immunologic: Negative for food allergies.   Neurological: Negative for dizziness, syncope, weakness and headaches.   Psychiatric/Behavioral: Negative for agitation, behavioral problems, confusion and decreased concentration.     Objective     Blood pressure 137/87, pulse 85, height 172.7 cm (68\"), weight 108 kg (238 lb 3.2 oz).    Physical Exam  Constitutional:       General: He is not in acute distress.     Appearance: He is well-developed. He is not diaphoretic.   HENT:      Head: Normocephalic and atraumatic.   Cardiovascular:      Rate and Rhythm: Normal rate and regular " rhythm.      Heart sounds: Normal heart sounds. No murmur heard.  No friction rub. No gallop.    Pulmonary:      Effort: No respiratory distress.      Breath sounds: Normal breath sounds. No wheezing or rales.   Chest:      Chest wall: No tenderness.   Abdominal:      General: Bowel sounds are normal. There is no distension.      Palpations: Abdomen is soft. There is no mass.      Tenderness: There is no abdominal tenderness. There is no guarding or rebound.      Hernia: No hernia is present.   Musculoskeletal:         General: Normal range of motion.   Skin:     General: Skin is warm and dry.      Coloration: Skin is not pale.      Findings: No erythema or rash.   Neurological:      Mental Status: He is alert and oriented to person, place, and time.   Psychiatric:         Behavior: Behavior normal.         Thought Content: Thought content normal.         Judgment: Judgment normal.          Assessment/Plan   Diagnoses and all orders for this visit:    1. Right upper quadrant abdominal pain (Primary)  -     Case Request; Standing  -     COVID PRE-OP / PRE-PROCEDURE SCREENING ORDER (NO ISOLATION) - Swab, Nasopharynx; Future  -     Case Request    2. Diarrhea, unspecified type    Other orders  -     Follow Anesthesia Guidelines / Standing Orders; Future  -     Obtain Informed Consent; Future  -     sodium-potassium-magnesium sulfates (SUPREP) 17.5-3.13-1.6 GM/177ML solution oral solution; Take 1 bottle by mouth Every 12 (Twelve) Hours.  Dispense: 1 mL; Refill: 0        ESOPHAGOGASTRODUODENOSCOPY (N/A), COLONOSCOPY (N/A)     Diagnosis Plan   1. Right upper quadrant abdominal pain  Case Request    COVID PRE-OP / PRE-PROCEDURE SCREENING ORDER (NO ISOLATION) - Swab, Nasopharynx    Case Request   2. Diarrhea, unspecified type         Anticipated Surgical Procedure:  Orders Placed This Encounter   Procedures   • COVID PRE-OP / PRE-PROCEDURE SCREENING ORDER (NO ISOLATION) - Swab, Nasopharynx     Standing Status:   Future      Standing Expiration Date:   1/28/2023     Order Specific Question:   Release to patient     Answer:   Immediate     Order Specific Question:   Please select your location:     Answer:   [facility]     Order Specific Question:   COVID Screening Order:     Answer:   In-House: PRE-OP: BD MAX, 8-10 HR TAT [[id number]]     Order Specific Question:   Previously tested for COVID-19?     Answer:   Yes     Order Specific Question:   Employed in healthcare setting?     Answer:   No     Order Specific Question:   Symptomatic for COVID-19 as defined by CDC?     Answer:   No     Order Specific Question:   Hospitalized for COVID-19?     Answer:   No     Order Specific Question:   Admitted to ICU for COVID-19?     Answer:   No     Order Specific Question:   Resident in a congregate (group) care setting?     Answer:   No   • Follow Anesthesia Guidelines / Standing Orders     Standing Status:   Future   • Obtain Informed Consent     Standing Status:   Future     Order Specific Question:   Informed Consent Given For     Answer:   egd and colonoscopy       The risks, benefits, and alternatives of this procedure have been discussed with the patient or the responsible party- the patient understands and agrees to proceed.

## 2022-01-28 NOTE — PATIENT INSTRUCTIONS
MyPlate from USDA    MyPlate is an outline of a general healthy diet based on the 2010 Dietary Guidelines for Americans, from the U.S. Department of Agriculture (USDA). It sets guidelines for how much food you should eat from each food group based on your age, sex, and level of physical activity.  What are tips for following MyPlate?  To follow MyPlate recommendations:  · Eat a wide variety of fruits and vegetables, grains, and protein foods.  · Serve smaller portions and eat less food throughout the day.  · Limit portion sizes to avoid overeating.  · Enjoy your food.  · Get at least 150 minutes of exercise every week. This is about 30 minutes each day, 5 or more days per week.  It can be difficult to have every meal look like MyPlate. Think about MyPlate as eating guidelines for an entire day, rather than each individual meal.  Fruits and vegetables  · Make half of your plate fruits and vegetables.  · Eat many different colors of fruits and vegetables each day.  · For a 2,000 calorie daily food plan, eat:  ? 2½ cups of vegetables every day.  ? 2 cups of fruit every day.  · 1 cup is equal to:  ? 1 cup raw or cooked vegetables.  ? 1 cup raw fruit.  ? 1 medium-sized orange, apple, or banana.  ? 1 cup 100% fruit or vegetable juice.  ? 2 cups raw leafy greens, such as lettuce, spinach, or kale.  ? ½ cup dried fruit.  Grains  · One fourth of your plate should be grains.  · Make at least half of the grains you eat each day whole grains.  · For a 2,000 calorie daily food plan, eat 6 oz of grains every day.  · 1 oz is equal to:  ? 1 slice bread.  ? 1 cup cereal.  ? ½ cup cooked rice, cereal, or pasta.  Protein  · One fourth of your plate should be protein.  · Eat a wide variety of protein foods, including meat, poultry, fish, eggs, beans, nuts, and tofu.  · For a 2,000 calorie daily food plan, eat 5½ oz of protein every day.  · 1 oz is equal to:  ? 1 oz meat, poultry, or fish.  ? ¼ cup cooked beans.  ? 1 egg.  ? ½ oz nuts  or seeds.  ? 1 Tbsp peanut butter.  Dairy  · Drink fat-free or low-fat (1%) milk.  · Eat or drink dairy as a side to meals.  · For a 2,000 calorie daily food plan, eat or drink 3 cups of dairy every day.  · 1 cup is equal to:  ? 1 cup milk, yogurt, cottage cheese, or soy milk (soy beverage).  ? 2 oz processed cheese.  ? 1½ oz natural cheese.  Fats, oils, salt, and sugars  · Only small amounts of oils are recommended.  · Avoid foods that are high in calories and low in nutritional value (empty calories), like foods high in fat or added sugars.  · Choose foods that are low in salt (sodium). Choose foods that have less than 140 milligrams (mg) of sodium per serving.  · Drink water instead of sugary drinks. Drink enough water each day to keep your urine pale yellow.  Where to find support  · Work with your health care provider or a nutrition specialist (dietitian) to develop a customized eating plan that is right for you.  · Download an raina (mobile application) to help you track your daily food intake.  Where to find more information  · Go to ChooseMyPlate.gov for more information.  Summary  · MyPlate is a general guideline for healthy eating from the USDA. It is based on the 2010 Dietary Guidelines for Americans.  · In general, fruits and vegetables should take up ½ of your plate, grains should take up ¼ of your plate, and protein should take up ¼ of your plate.  This information is not intended to replace advice given to you by your health care provider. Make sure you discuss any questions you have with your health care provider.  Document Revised: 05/21/2020 Document Reviewed: 03/19/2018  Elsevier Patient Education © 2021 [employer]vier Inc.  BMI for Adults  What is BMI?  Body mass index (BMI) is a number that is calculated from a person's weight and height. BMI can help estimate how much of a person's weight is composed of fat. BMI does not measure body fat directly. Rather, it is an alternative to procedures that  "directly measure body fat, which can be difficult and expensive.  BMI can help identify people who may be at higher risk for certain medical problems.  What are BMI measurements used for?  BMI is used as a screening tool to identify possible weight problems. It helps determine whether a person is obese, overweight, a healthy weight, or underweight.  BMI is useful for:  · Identifying a weight problem that may be related to a medical condition or may increase the risk for medical problems.  · Promoting changes, such as changes in diet and exercise, to help reach a healthy weight. BMI screening can be repeated to see if these changes are working.  How is BMI calculated?  BMI involves measuring your weight in relation to your height. Both height and weight are measured, and the BMI is calculated from those numbers. This can be done either in English (U[place] or metric measurements. Note that charts and online BMI calculators are available to help you find your BMI quickly and easily without having to do these calculations yourself.  To calculate your BMI in English (U.S[place] measurements:    1. Measure your weight in pounds (lb).  2. Multiply the number of pounds by 703.  ? For example, for a person who weighs 180 lb, multiply that number by 703, which equals 126,540.  3. Measure your height in inches. Then multiply that number by itself to get a measurement called \"inches squared.\"  ? For example, for a person who is 70 inches tall, the \"inches squared\" measurement is 70 inches x 70 inches, which equals 4,900 inches squared.  4. Divide the total from step 2 (number of lb x 703) by the total from step 3 (inches squared): 126,540 ÷ 4,900 = 25.8. This is your BMI.    To calculate your BMI in metric measurements:  1. Measure your weight in kilograms (kg).  2. Measure your height in meters (m). Then multiply that number by itself to get a measurement called \"meters squared.\"  ? For example, for a person who is 1.75 m tall, the " "\"meters squared\" measurement is 1.75 m x 1.75 m, which is equal to 3.1 meters squared.  3. Divide the number of kilograms (your weight) by the meters squared number. In this example: 70 ÷ 3.1 = 22.6. This is your BMI.  What do the results mean?  BMI charts are used to identify whether you are underweight, normal weight, overweight, or obese. The following guidelines will be used:  · Underweight: BMI less than 18.5.  · Normal weight: BMI between 18.5 and 24.9.  · Overweight: BMI between 25 and 29.9.  · Obese: BMI of 30 or above.  Keep these notes in mind:  · Weight includes both fat and muscle, so someone with a muscular build, such as an athlete, may have a BMI that is higher than 24.9. In cases like these, BMI is not an accurate measure of body fat.  · To determine if excess body fat is the cause of a BMI of 25 or higher, further assessments may need to be done by a health care provider.  · BMI is usually interpreted in the same way for men and women.  Where to find more information  For more information about BMI, including tools to quickly calculate your BMI, go to these websites:  · Centers for Disease Control and Prevention: www.cdc.gov  · American Heart Association: www.heart.org  · National Heart, Lung, and Blood [place]: www.nhlbi.nih.gov  Summary  · Body mass index (BMI) is a number that is calculated from a person's weight and height.  · BMI may help estimate how much of a person's weight is composed of fat. BMI can help identify those who may be at higher risk for certain medical problems.  · BMI can be measured using English measurements or metric measurements.  · BMI charts are used to identify whether you are underweight, normal weight, overweight, or obese.  This information is not intended to replace advice given to you by your health care provider. Make sure you discuss any questions you have with your health care provider.  Document Revised: 09/09/2020 Document Reviewed: 07/17/2020  Dariela " Patient Education © 2021 Elsevier Inc.

## 2022-01-30 ENCOUNTER — ANESTHESIA EVENT (OUTPATIENT)
Dept: GASTROENTEROLOGY | Facility: HOSPITAL | Age: 32
End: 2022-01-30

## 2022-01-31 ENCOUNTER — HOSPITAL ENCOUNTER (OUTPATIENT)
Facility: HOSPITAL | Age: 32
Setting detail: HOSPITAL OUTPATIENT SURGERY
Discharge: HOME | End: 2022-01-31
Attending: INTERNAL MEDICINE | Admitting: INTERNAL MEDICINE

## 2022-01-31 ENCOUNTER — ANESTHESIA (OUTPATIENT)
Dept: GASTROENTEROLOGY | Facility: HOSPITAL | Age: 32
End: 2022-01-31

## 2022-01-31 VITALS
HEART RATE: 74 BPM | TEMPERATURE: 97.2 F | OXYGEN SATURATION: 96 % | DIASTOLIC BLOOD PRESSURE: 90 MMHG | SYSTOLIC BLOOD PRESSURE: 144 MMHG | RESPIRATION RATE: 20 BRPM

## 2022-01-31 DIAGNOSIS — R10.11 RIGHT UPPER QUADRANT ABDOMINAL PAIN: ICD-10-CM

## 2022-01-31 PROCEDURE — 25010000002 PROPOFOL 10 MG/ML EMULSION

## 2022-01-31 PROCEDURE — 45380 COLONOSCOPY AND BIOPSY: CPT | Performed by: INTERNAL MEDICINE

## 2022-01-31 PROCEDURE — 43239 EGD BIOPSY SINGLE/MULTIPLE: CPT | Performed by: INTERNAL MEDICINE

## 2022-01-31 RX ORDER — LIDOCAINE HYDROCHLORIDE 20 MG/ML
INJECTION, SOLUTION INTRAVENOUS AS NEEDED
Status: DISCONTINUED | OUTPATIENT
Start: 2022-01-31 | End: 2022-01-31 | Stop reason: SURG

## 2022-01-31 RX ORDER — PROPOFOL 10 MG/ML
VIAL (ML) INTRAVENOUS AS NEEDED
Status: DISCONTINUED | OUTPATIENT
Start: 2022-01-31 | End: 2022-01-31 | Stop reason: SURG

## 2022-01-31 RX ORDER — PROMETHAZINE HYDROCHLORIDE 25 MG/1
25 TABLET ORAL ONCE AS NEEDED
Status: DISCONTINUED | OUTPATIENT
Start: 2022-01-31 | End: 2022-01-31 | Stop reason: HOSPADM

## 2022-01-31 RX ORDER — PROMETHAZINE HYDROCHLORIDE 25 MG/1
25 SUPPOSITORY RECTAL ONCE AS NEEDED
Status: DISCONTINUED | OUTPATIENT
Start: 2022-01-31 | End: 2022-01-31 | Stop reason: HOSPADM

## 2022-01-31 RX ORDER — MEPERIDINE HYDROCHLORIDE 25 MG/ML
12.5 INJECTION INTRAMUSCULAR; INTRAVENOUS; SUBCUTANEOUS
Status: DISCONTINUED | OUTPATIENT
Start: 2022-01-31 | End: 2022-01-31 | Stop reason: HOSPADM

## 2022-01-31 RX ORDER — DEXTROSE AND SODIUM CHLORIDE 5; .45 G/100ML; G/100ML
30 INJECTION, SOLUTION INTRAVENOUS CONTINUOUS PRN
Status: DISCONTINUED | OUTPATIENT
Start: 2022-01-31 | End: 2022-01-31 | Stop reason: HOSPADM

## 2022-01-31 RX ORDER — ONDANSETRON 2 MG/ML
4 INJECTION INTRAMUSCULAR; INTRAVENOUS ONCE AS NEEDED
Status: DISCONTINUED | OUTPATIENT
Start: 2022-01-31 | End: 2022-01-31 | Stop reason: HOSPADM

## 2022-01-31 RX ADMIN — PROPOFOL 120 MG: 10 INJECTION, EMULSION INTRAVENOUS at 14:14

## 2022-01-31 RX ADMIN — LIDOCAINE HYDROCHLORIDE 100 MG: 20 INJECTION, SOLUTION INTRAVENOUS at 14:14

## 2022-01-31 RX ADMIN — DEXTROSE AND SODIUM CHLORIDE 30 ML/HR: 5; 450 INJECTION, SOLUTION INTRAVENOUS at 13:15

## 2022-01-31 RX ADMIN — PROPOFOL 20 MG: 10 INJECTION, EMULSION INTRAVENOUS at 14:18

## 2022-01-31 RX ADMIN — PROPOFOL 30 MG: 10 INJECTION, EMULSION INTRAVENOUS at 14:21

## 2022-01-31 NOTE — ANESTHESIA POSTPROCEDURE EVALUATION
Patient: Joseph Jensen    Procedure Summary     Date: 01/31/22 Room / Location: [facility] ENDOSCOPY 1 / [facility] ENDOSCOPY    Anesthesia Start: 1356 Anesthesia Stop: 1429    Procedures:       ESOPHAGOGASTRODUODENOSCOPY (N/A )      COLONOSCOPY (N/A ) Diagnosis:       Right upper quadrant abdominal pain      (Right upper quadrant abdominal pain [R10.11])    Surgeons: Romario Canela MD Provider: Mallory Cross CRNA    Anesthesia Type: MAC ASA Status: 2          Anesthesia Type: MAC    Vitals  No vitals data found for the desired time range.          Post Anesthesia Care and Evaluation    Patient location during evaluation: bedside  Patient participation: waiting for patient participation  Level of consciousness: responsive to verbal stimuli  Pain management: adequate  Airway patency: patent  Anesthetic complications: No anesthetic complications  PONV Status: none  Cardiovascular status: acceptable  Respiratory status: acceptable  Hydration status: acceptable    Comments: ---------------------------               01/31/22                      1307         ---------------------------   BP:        121/71        Pulse:         84           Resp:          18           Temp:   98.9 °F (37.2 °C)   SpO2:         100%         ---------------------------

## 2022-01-31 NOTE — ANESTHESIA PREPROCEDURE EVALUATION
Anesthesia Evaluation     Patient summary reviewed and Nursing notes reviewed   NPO Solid Status: > 8 hours  NPO Liquid Status: > 6 hours           Airway   Mallampati: II  TM distance: >3 FB  Dental - normal exam     Pulmonary - negative pulmonary ROS    breath sounds clear to auscultation  Cardiovascular - negative cardio ROS    Rhythm: regular  Rate: normal        Neuro/Psych  (+) headaches, psychiatric history Anxiety,     GI/Hepatic/Renal/Endo    (+)   liver disease, renal disease stones,     Musculoskeletal (-) negative ROS    Abdominal    Substance History - negative use     OB/GYN negative ob/gyn ROS         Other                        Anesthesia Plan    ASA 2     MAC     intravenous induction     Anesthetic plan, all risks, benefits, and alternatives have been provided, discussed and informed consent has been obtained with: patient.        CODE STATUS:

## 2022-02-07 LAB
LAB AP CASE REPORT: NORMAL
PATH REPORT.FINAL DX SPEC: NORMAL

## 2022-06-02 ENCOUNTER — LAB (OUTPATIENT)
Dept: LAB | Facility: OTHER | Age: 32
End: 2022-06-02

## 2022-06-02 ENCOUNTER — OFFICE VISIT (OUTPATIENT)
Dept: FAMILY MEDICINE CLINIC | Facility: CLINIC | Age: 32
End: 2022-06-02

## 2022-06-02 VITALS
OXYGEN SATURATION: 97 % | BODY MASS INDEX: 36.83 KG/M2 | DIASTOLIC BLOOD PRESSURE: 84 MMHG | WEIGHT: 243 LBS | HEIGHT: 68 IN | SYSTOLIC BLOOD PRESSURE: 118 MMHG | HEART RATE: 72 BPM

## 2022-06-02 DIAGNOSIS — K75.81 NASH (NONALCOHOLIC STEATOHEPATITIS): ICD-10-CM

## 2022-06-02 DIAGNOSIS — Z87.442 HISTORY OF RENAL CALCULI: ICD-10-CM

## 2022-06-02 DIAGNOSIS — R10.9 RIGHT FLANK PAIN: ICD-10-CM

## 2022-06-02 DIAGNOSIS — F41.9 ANXIETY AND DEPRESSION: Primary | ICD-10-CM

## 2022-06-02 DIAGNOSIS — R10.31 RLQ ABDOMINAL PAIN: ICD-10-CM

## 2022-06-02 DIAGNOSIS — R30.0 DYSURIA: ICD-10-CM

## 2022-06-02 DIAGNOSIS — F32.A ANXIETY AND DEPRESSION: Primary | ICD-10-CM

## 2022-06-02 DIAGNOSIS — N20.0 RENAL CALCULI: ICD-10-CM

## 2022-06-02 LAB
BILIRUB UR QL STRIP: NEGATIVE
CLARITY UR: CLEAR
COLOR UR: YELLOW
GLUCOSE UR STRIP-MCNC: NEGATIVE MG/DL
HGB UR QL STRIP.AUTO: NEGATIVE
KETONES UR QL STRIP: NEGATIVE
LEUKOCYTE ESTERASE UR QL STRIP.AUTO: NEGATIVE
NITRITE UR QL STRIP: NEGATIVE
PH UR STRIP.AUTO: <=5 [PH] (ref 5.5–8)
PROT UR QL STRIP: NEGATIVE
SP GR UR STRIP: >=1.03 (ref 1–1.03)
UROBILINOGEN UR QL STRIP: ABNORMAL

## 2022-06-02 PROCEDURE — 84478 ASSAY OF TRIGLYCERIDES: CPT | Performed by: INTERNAL MEDICINE

## 2022-06-02 PROCEDURE — 82977 ASSAY OF GGT: CPT | Performed by: INTERNAL MEDICINE

## 2022-06-02 PROCEDURE — 84460 ALANINE AMINO (ALT) (SGPT): CPT | Performed by: INTERNAL MEDICINE

## 2022-06-02 PROCEDURE — 99214 OFFICE O/P EST MOD 30 MIN: CPT | Performed by: NURSE PRACTITIONER

## 2022-06-02 PROCEDURE — 86038 ANTINUCLEAR ANTIBODIES: CPT | Performed by: INTERNAL MEDICINE

## 2022-06-02 PROCEDURE — 86015 ACTIN ANTIBODY EACH: CPT | Performed by: INTERNAL MEDICINE

## 2022-06-02 PROCEDURE — 82172 ASSAY OF APOLIPOPROTEIN: CPT | Performed by: INTERNAL MEDICINE

## 2022-06-02 PROCEDURE — 81003 URINALYSIS AUTO W/O SCOPE: CPT | Performed by: NURSE PRACTITIONER

## 2022-06-02 PROCEDURE — 83540 ASSAY OF IRON: CPT | Performed by: INTERNAL MEDICINE

## 2022-06-02 PROCEDURE — 82947 ASSAY GLUCOSE BLOOD QUANT: CPT | Performed by: INTERNAL MEDICINE

## 2022-06-02 PROCEDURE — 83010 ASSAY OF HAPTOGLOBIN QUANT: CPT | Performed by: INTERNAL MEDICINE

## 2022-06-02 PROCEDURE — 82465 ASSAY BLD/SERUM CHOLESTEROL: CPT | Performed by: INTERNAL MEDICINE

## 2022-06-02 PROCEDURE — 82390 ASSAY OF CERULOPLASMIN: CPT | Performed by: INTERNAL MEDICINE

## 2022-06-02 PROCEDURE — 86381 MITOCHONDRIAL ANTIBODY EACH: CPT | Performed by: INTERNAL MEDICINE

## 2022-06-02 PROCEDURE — 84450 TRANSFERASE (AST) (SGOT): CPT | Performed by: INTERNAL MEDICINE

## 2022-06-02 PROCEDURE — 83883 ASSAY NEPHELOMETRY NOT SPEC: CPT | Performed by: INTERNAL MEDICINE

## 2022-06-02 PROCEDURE — 82103 ALPHA-1-ANTITRYPSIN TOTAL: CPT | Performed by: INTERNAL MEDICINE

## 2022-06-02 PROCEDURE — 84466 ASSAY OF TRANSFERRIN: CPT | Performed by: INTERNAL MEDICINE

## 2022-06-02 PROCEDURE — 82247 BILIRUBIN TOTAL: CPT | Performed by: INTERNAL MEDICINE

## 2022-06-02 RX ORDER — TAMSULOSIN HYDROCHLORIDE 0.4 MG/1
1 CAPSULE ORAL DAILY
Qty: 30 CAPSULE | Refills: 0 | Status: SHIPPED | OUTPATIENT
Start: 2022-06-02

## 2022-06-02 RX ORDER — FLUOXETINE HYDROCHLORIDE 20 MG/1
20 CAPSULE ORAL DAILY
Qty: 30 CAPSULE | Refills: 5 | Status: SHIPPED | OUTPATIENT
Start: 2022-06-02

## 2022-06-02 RX ORDER — KETOROLAC TROMETHAMINE 10 MG/1
10 TABLET, FILM COATED ORAL EVERY 6 HOURS PRN
Qty: 20 TABLET | Refills: 0 | Status: SHIPPED | OUTPATIENT
Start: 2022-06-02 | End: 2023-02-04 | Stop reason: SDUPTHER

## 2022-06-02 RX ORDER — ONDANSETRON 4 MG/1
4 TABLET, FILM COATED ORAL EVERY 8 HOURS PRN
Qty: 20 TABLET | Refills: 1 | Status: SHIPPED | OUTPATIENT
Start: 2022-06-02

## 2022-06-02 NOTE — PROGRESS NOTES
CC: Follow-up (6 month FU, has had more kidney pain, trouble urinating x1 week )      Subjective:  Joseph Jensen is a 32 y.o. male who presents for         Patient presents to office for 6-month follow-up on anxiety and depression.  He continues to take Wellbutrin XL and BuSpar as needed.  He states this has helped in the past with symptoms. States currently having some relationship struggles and feels that the anxiety and depression are worse currently.    Patient states he passed a kidney stone in February 2022.  He states he is having more kidney pain bilaterally and trouble urinating x1 week. States he has dysuria at times and seems to have to strain more to urinate. Pain is described as sharp. It is a constant dull pain but sharp at times. Rates pain at 6 on pain scale of 1-10. Denies any associated fever, chills, nausea, or vomiting.   No noted aggravating or alleviating factors. States has treated with IBPF with minimal relief.     He doesn't plan on getting the second dose of COVID 19 vaccine. Declines to have pneumovax given while in office today.      The following portions of the patient's history were reviewed and updated as appropriate: allergies, current medications, past family history, past medical history, past social history, past surgical history and problem list.    Past Medical History:   Diagnosis Date   • ADHD (attention deficit hyperactivity disorder)    • Anxiety    • Headache    • Kidney stones          Current Outpatient Medications:   •  baclofen (LIORESAL) 10 MG tablet, Take 1 tablet by mouth 3 (Three) Times a Day As Needed for Muscle Spasms (muscular cramps/tension)., Disp: 90 tablet, Rfl: 0  •  buPROPion XL (WELLBUTRIN XL) 300 MG 24 hr tablet, Take 1 tablet by mouth Daily., Disp: 30 tablet, Rfl: 5  •  busPIRone (BUSPAR) 5 MG tablet, Take 1 tablet by mouth 3 (Three) Times a Day As Needed (anxiety)., Disp: 90 tablet, Rfl: 5  •  vitamin D (ERGOCALCIFEROL) 1.25 MG (27681 UT)  "capsule capsule, Take 1 capsule by mouth 1 (One) Time Per Week., Disp: 5 capsule, Rfl: 1  •  FLUoxetine (PROzac) 20 MG capsule, Take 1 capsule by mouth Daily., Disp: 30 capsule, Rfl: 5  •  ketorolac (TORADOL) 10 MG tablet, Take 1 tablet by mouth Every 6 (Six) Hours As Needed for Moderate Pain ., Disp: 20 tablet, Rfl: 0  •  ondansetron (Zofran) 4 MG tablet, Take 1 tablet by mouth Every 8 (Eight) Hours As Needed for Nausea or Vomiting., Disp: 20 tablet, Rfl: 1  •  tamsulosin (FLOMAX) 0.4 MG capsule 24 hr capsule, Take 1 capsule by mouth Daily., Disp: 30 capsule, Rfl: 0    Review of Systems    Review of Systems   Constitutional: Negative.    HENT: Negative.    Eyes: Negative.    Respiratory: Negative.    Cardiovascular: Negative.    Gastrointestinal: Negative.    Endocrine: Negative.    Genitourinary: Positive for dysuria, flank pain and urgency.   Skin: Negative.    Allergic/Immunologic: Negative.    Neurological: Negative.    Hematological: Negative.    Psychiatric/Behavioral: Negative.    All other systems reviewed and are negative.      Objective  Vitals:    06/02/22 1252   BP: 118/84   Pulse: 72   SpO2: 97%   Weight: 110 kg (243 lb)   Height: 172.7 cm (68\")     Body mass index is 36.95 kg/m².    Physical Exam    Physical Exam  Vitals and nursing note reviewed.   Constitutional:       General: He is not in acute distress.     Appearance: Normal appearance. He is not ill-appearing, toxic-appearing or diaphoretic.   HENT:      Head: Normocephalic and atraumatic.   Cardiovascular:      Rate and Rhythm: Normal rate and regular rhythm.      Pulses: Normal pulses.      Heart sounds: Normal heart sounds. No murmur heard.    No friction rub. No gallop.   Pulmonary:      Effort: Pulmonary effort is normal. No respiratory distress.      Breath sounds: Normal breath sounds. No stridor. No wheezing, rhonchi or rales.   Chest:      Chest wall: No tenderness.   Abdominal:      General: Abdomen is flat. Bowel sounds are normal. " There is no distension.      Palpations: Abdomen is soft. There is no mass.      Tenderness: There is abdominal tenderness. There is right CVA tenderness. There is no left CVA tenderness, guarding or rebound.      Hernia: No hernia is present.      Comments: There is rt sided lower abdominal pain on palpation of the abdomen. No rebound or guarding noted.   Musculoskeletal:      Cervical back: Normal range of motion and neck supple.   Neurological:      Mental Status: He is alert.             Diagnoses and all orders for this visit:    1. Anxiety and depression (Primary)  -     FLUoxetine (PROzac) 20 MG capsule; Take 1 capsule by mouth Daily.  Dispense: 30 capsule; Refill: 5    2. Right flank pain  -     Urinalysis With Culture If Indicated -; Future  -     XR Abdomen KUB (In Office)    3. RLQ abdominal pain  -     Urinalysis With Culture If Indicated -; Future  -     XR Abdomen KUB (In Office)    4. History of renal calculi  -     Urinalysis With Culture If Indicated -; Future  -     XR Abdomen KUB (In Office)    5. Dysuria  -     Urinalysis With Culture If Indicated -; Future  -     XR Abdomen KUB (In Office)    6. Renal calculi  -     tamsulosin (FLOMAX) 0.4 MG capsule 24 hr capsule; Take 1 capsule by mouth Daily.  Dispense: 30 capsule; Refill: 0  -     ketorolac (TORADOL) 10 MG tablet; Take 1 tablet by mouth Every 6 (Six) Hours As Needed for Moderate Pain .  Dispense: 20 tablet; Refill: 0  -     ondansetron (Zofran) 4 MG tablet; Take 1 tablet by mouth Every 8 (Eight) Hours As Needed for Nausea or Vomiting.  Dispense: 20 tablet; Refill: 1       For the anxiety and depression that is uncontrolled with current medications, will start patient on Prozac 20 mg daily in addition to the Wellbutrin and BuSpar.  Patient instructed on how to take and possible side effects of this medication.  Because of the right flank pain, right lower quadrant abdominal pain, history of renal calculi, and dysuria we will check a  urinalysis and a KUB.  Urinalysis and KUB reviewed with patient while in office.  Patient does have a 2 mm right renal calculus.  Patient given Flomax, Toradol, and Zofran.  Patient instructed on how to take these medications and possible side effects.  Did advise to strain all urine.  Advised to follow-up if no improvement in pain or go to the ER.  Answered all questions.  Patient verbalized understanding of plan of care.  To follow-up in 6 months or sooner if needed.  At that time we will perform a complete exam with labs.        This document has been electronically signed by AYAAN Pennington on June 2, 2022 14:40 CDT

## 2022-06-03 LAB
ALPHA1 GLOB MFR UR ELPH: 124.9 MG/DL (ref 90–200)
CERULOPLASMIN SERPL-MCNC: 21 MG/DL (ref 16–31)
IRON 24H UR-MRATE: 61 MCG/DL (ref 59–158)
IRON SATN MFR SERPL: 14 % (ref 20–50)
TIBC SERPL-MCNC: 437 MCG/DL (ref 298–536)
TRANSFERRIN SERPL-MCNC: 293 MG/DL (ref 200–360)

## 2022-06-04 DIAGNOSIS — F41.9 ANXIETY AND DEPRESSION: Chronic | ICD-10-CM

## 2022-06-04 DIAGNOSIS — F32.A ANXIETY AND DEPRESSION: Chronic | ICD-10-CM

## 2022-06-04 LAB
MITOCHONDRIA M2 IGG SER-ACNC: <20 UNITS (ref 0–20)
SMA IGG SER-ACNC: 5 UNITS (ref 0–19)

## 2022-06-06 LAB — ANA SER QL: NEGATIVE

## 2022-06-06 RX ORDER — BUPROPION HYDROCHLORIDE 300 MG/1
300 TABLET ORAL DAILY
Qty: 30 TABLET | Refills: 5 | Status: SHIPPED | OUTPATIENT
Start: 2022-06-06 | End: 2023-02-04 | Stop reason: SDUPTHER

## 2022-06-06 RX ORDER — ERGOCALCIFEROL 1.25 MG/1
50000 CAPSULE ORAL WEEKLY
Qty: 5 CAPSULE | Refills: 1 | Status: SHIPPED | OUTPATIENT
Start: 2022-06-06 | End: 2023-02-04 | Stop reason: SDUPTHER

## 2022-06-07 LAB
A2 MACROGLOB SERPL-MCNC: 137 MG/DL (ref 110–276)
ALT SERPL W P-5'-P-CCNC: 153 IU/L (ref 0–55)
APO A-I SERPL-MCNC: 114 MG/DL (ref 101–178)
AST SERPL W P-5'-P-CCNC: 67 IU/L (ref 0–40)
BILIRUB SERPL-MCNC: 0.3 MG/DL (ref 0–1.2)
CHOLEST SERPL-MCNC: 164 MG/DL (ref 100–199)
FIBROSIS SCORING:: ABNORMAL
FIBROSIS STAGE SERPL QL: ABNORMAL
GGT SERPL-CCNC: 78 IU/L (ref 0–65)
GLUCOSE SERPL-MCNC: 91 MG/DL (ref 65–99)
HAPTOGLOB SERPL-MCNC: 143 MG/DL (ref 17–317)
INTERPRETATIONS: (REFERENCE): ABNORMAL
LABORATORY COMMENT REPORT: ABNORMAL
LIVER FIBR SCORE SERPL CALC.FIBROSURE: 0.1 (ref 0–0.21)
NASH SCORING (REFERENCE): ABNORMAL
NECROINFLAMMATORY ACT GRADE SERPL QL: ABNORMAL
NECROINFLAMMATORY ACT SCORE SERPL: 0.5
SERVICE CMNT-IMP: ABNORMAL
STEATOSIS GRADE (REFERENCE): ABNORMAL
STEATOSIS GRADING (REFERENCE): ABNORMAL
STEATOSIS SCORE (REFERENCE): 0.86 (ref 0–0.3)
TRIGL SERPL-MCNC: 301 MG/DL (ref 0–149)

## 2023-02-04 DIAGNOSIS — F41.9 ANXIETY AND DEPRESSION: Chronic | ICD-10-CM

## 2023-02-04 DIAGNOSIS — F32.A ANXIETY AND DEPRESSION: Chronic | ICD-10-CM

## 2023-02-04 DIAGNOSIS — N20.0 RENAL CALCULI: ICD-10-CM

## 2023-02-06 RX ORDER — BUSPIRONE HYDROCHLORIDE 5 MG/1
5 TABLET ORAL 3 TIMES DAILY PRN
Qty: 90 TABLET | Refills: 0 | Status: SHIPPED | OUTPATIENT
Start: 2023-02-06

## 2023-02-06 RX ORDER — BUPROPION HYDROCHLORIDE 300 MG/1
300 TABLET ORAL DAILY
Qty: 30 TABLET | Refills: 0 | Status: SHIPPED | OUTPATIENT
Start: 2023-02-06

## 2023-02-06 RX ORDER — KETOROLAC TROMETHAMINE 10 MG/1
10 TABLET, FILM COATED ORAL EVERY 6 HOURS PRN
Qty: 20 TABLET | Refills: 0 | Status: SHIPPED | OUTPATIENT
Start: 2023-02-06

## 2023-02-06 RX ORDER — ERGOCALCIFEROL 1.25 MG/1
50000 CAPSULE ORAL WEEKLY
Qty: 4 CAPSULE | Refills: 0 | Status: SHIPPED | OUTPATIENT
Start: 2023-02-06

## (undated) DEVICE — SINGLE-USE BIOPSY FORCEPS: Brand: RADIAL JAW 4

## (undated) DEVICE — BITEBLOCK ENDO W/STRAP 60F A/ LF DISP